# Patient Record
Sex: MALE | Race: WHITE | NOT HISPANIC OR LATINO | Employment: FULL TIME | URBAN - METROPOLITAN AREA
[De-identification: names, ages, dates, MRNs, and addresses within clinical notes are randomized per-mention and may not be internally consistent; named-entity substitution may affect disease eponyms.]

---

## 2024-08-14 ENCOUNTER — APPOINTMENT (OUTPATIENT)
Dept: RADIOLOGY | Facility: HOSPITAL | Age: 39
End: 2024-08-14

## 2024-08-14 ENCOUNTER — HOSPITAL ENCOUNTER (EMERGENCY)
Facility: HOSPITAL | Age: 39
Discharge: HOME | End: 2024-08-14
Attending: STUDENT IN AN ORGANIZED HEALTH CARE EDUCATION/TRAINING PROGRAM

## 2024-08-14 VITALS
RESPIRATION RATE: 16 BRPM | WEIGHT: 190 LBS | DIASTOLIC BLOOD PRESSURE: 77 MMHG | HEIGHT: 70 IN | OXYGEN SATURATION: 95 % | BODY MASS INDEX: 27.2 KG/M2 | SYSTOLIC BLOOD PRESSURE: 122 MMHG | TEMPERATURE: 98.1 F | HEART RATE: 98 BPM

## 2024-08-14 DIAGNOSIS — N45.1 EPIDIDYMITIS: Primary | ICD-10-CM

## 2024-08-14 LAB
ABO GROUP (TYPE) IN BLOOD: NORMAL
ALBUMIN SERPL BCP-MCNC: 3.8 G/DL (ref 3.4–5)
ALP SERPL-CCNC: 73 U/L (ref 33–120)
ALT SERPL W P-5'-P-CCNC: 24 U/L (ref 10–52)
ANION GAP SERPL CALC-SCNC: 11 MMOL/L (ref 10–20)
ANTIBODY SCREEN: NORMAL
AST SERPL W P-5'-P-CCNC: 24 U/L (ref 9–39)
BASOPHILS # BLD AUTO: 0.05 X10*3/UL (ref 0–0.1)
BASOPHILS NFR BLD AUTO: 0.4 %
BILIRUB SERPL-MCNC: 1.5 MG/DL (ref 0–1.2)
BUN SERPL-MCNC: 7 MG/DL (ref 6–23)
CALCIUM SERPL-MCNC: 9 MG/DL (ref 8.6–10.3)
CHLORIDE SERPL-SCNC: 96 MMOL/L (ref 98–107)
CO2 SERPL-SCNC: 26 MMOL/L (ref 21–32)
CREAT SERPL-MCNC: 0.97 MG/DL (ref 0.5–1.3)
EGFRCR SERPLBLD CKD-EPI 2021: >90 ML/MIN/1.73M*2
EOSINOPHIL # BLD AUTO: 0.06 X10*3/UL (ref 0–0.7)
EOSINOPHIL NFR BLD AUTO: 0.5 %
ERYTHROCYTE [DISTWIDTH] IN BLOOD BY AUTOMATED COUNT: 15.1 % (ref 11.5–14.5)
GLUCOSE SERPL-MCNC: 144 MG/DL (ref 74–99)
HCT VFR BLD AUTO: 35.1 % (ref 41–52)
HGB BLD-MCNC: 11.7 G/DL (ref 13.5–17.5)
IMM GRANULOCYTES # BLD AUTO: 0.06 X10*3/UL (ref 0–0.7)
IMM GRANULOCYTES NFR BLD AUTO: 0.5 % (ref 0–0.9)
INR PPP: 1.4 (ref 0.9–1.1)
LACTATE SERPL-SCNC: 1.2 MMOL/L (ref 0.4–2)
LYMPHOCYTES # BLD AUTO: 1.37 X10*3/UL (ref 1.2–4.8)
LYMPHOCYTES NFR BLD AUTO: 10.6 %
MCH RBC QN AUTO: 28.7 PG (ref 26–34)
MCHC RBC AUTO-ENTMCNC: 33.3 G/DL (ref 32–36)
MCV RBC AUTO: 86 FL (ref 80–100)
MONOCYTES # BLD AUTO: 1.07 X10*3/UL (ref 0.1–1)
MONOCYTES NFR BLD AUTO: 8.3 %
NEUTROPHILS # BLD AUTO: 10.34 X10*3/UL (ref 1.2–7.7)
NEUTROPHILS NFR BLD AUTO: 79.7 %
NRBC BLD-RTO: 0 /100 WBCS (ref 0–0)
PLATELET # BLD AUTO: 256 X10*3/UL (ref 150–450)
POTASSIUM SERPL-SCNC: 3.9 MMOL/L (ref 3.5–5.3)
PROT SERPL-MCNC: 8.3 G/DL (ref 6.4–8.2)
PROTHROMBIN TIME: 16 SECONDS (ref 9.8–12.8)
RBC # BLD AUTO: 4.07 X10*6/UL (ref 4.5–5.9)
RH FACTOR (ANTIGEN D): NORMAL
SODIUM SERPL-SCNC: 129 MMOL/L (ref 136–145)
WBC # BLD AUTO: 13 X10*3/UL (ref 4.4–11.3)

## 2024-08-14 PROCEDURE — 2550000001 HC RX 255 CONTRASTS: Performed by: STUDENT IN AN ORGANIZED HEALTH CARE EDUCATION/TRAINING PROGRAM

## 2024-08-14 PROCEDURE — 83605 ASSAY OF LACTIC ACID: CPT | Performed by: STUDENT IN AN ORGANIZED HEALTH CARE EDUCATION/TRAINING PROGRAM

## 2024-08-14 PROCEDURE — 76870 US EXAM SCROTUM: CPT | Mod: FOREIGN READ | Performed by: RADIOLOGY

## 2024-08-14 PROCEDURE — 96365 THER/PROPH/DIAG IV INF INIT: CPT | Mod: 59

## 2024-08-14 PROCEDURE — 99285 EMERGENCY DEPT VISIT HI MDM: CPT | Mod: 25

## 2024-08-14 PROCEDURE — 36415 COLL VENOUS BLD VENIPUNCTURE: CPT | Performed by: STUDENT IN AN ORGANIZED HEALTH CARE EDUCATION/TRAINING PROGRAM

## 2024-08-14 PROCEDURE — 85610 PROTHROMBIN TIME: CPT | Performed by: STUDENT IN AN ORGANIZED HEALTH CARE EDUCATION/TRAINING PROGRAM

## 2024-08-14 PROCEDURE — 86901 BLOOD TYPING SEROLOGIC RH(D): CPT | Performed by: STUDENT IN AN ORGANIZED HEALTH CARE EDUCATION/TRAINING PROGRAM

## 2024-08-14 PROCEDURE — 96361 HYDRATE IV INFUSION ADD-ON: CPT

## 2024-08-14 PROCEDURE — 2500000001 HC RX 250 WO HCPCS SELF ADMINISTERED DRUGS (ALT 637 FOR MEDICARE OP): Performed by: STUDENT IN AN ORGANIZED HEALTH CARE EDUCATION/TRAINING PROGRAM

## 2024-08-14 PROCEDURE — 74177 CT ABD & PELVIS W/CONTRAST: CPT

## 2024-08-14 PROCEDURE — 85025 COMPLETE CBC W/AUTO DIFF WBC: CPT | Performed by: STUDENT IN AN ORGANIZED HEALTH CARE EDUCATION/TRAINING PROGRAM

## 2024-08-14 PROCEDURE — 76870 US EXAM SCROTUM: CPT

## 2024-08-14 PROCEDURE — 96375 TX/PRO/DX INJ NEW DRUG ADDON: CPT

## 2024-08-14 PROCEDURE — 84075 ASSAY ALKALINE PHOSPHATASE: CPT | Performed by: STUDENT IN AN ORGANIZED HEALTH CARE EDUCATION/TRAINING PROGRAM

## 2024-08-14 PROCEDURE — 2500000004 HC RX 250 GENERAL PHARMACY W/ HCPCS (ALT 636 FOR OP/ED): Performed by: STUDENT IN AN ORGANIZED HEALTH CARE EDUCATION/TRAINING PROGRAM

## 2024-08-14 RX ORDER — KETOROLAC TROMETHAMINE 30 MG/ML
15 INJECTION, SOLUTION INTRAMUSCULAR; INTRAVENOUS ONCE
Status: COMPLETED | OUTPATIENT
Start: 2024-08-14 | End: 2024-08-14

## 2024-08-14 RX ORDER — CEFUROXIME AXETIL 500 MG/1
500 TABLET ORAL 2 TIMES DAILY
Qty: 20 TABLET | Refills: 0 | Status: ON HOLD | OUTPATIENT
Start: 2024-08-14 | End: 2024-08-24

## 2024-08-14 RX ORDER — HYDROMORPHONE HYDROCHLORIDE 1 MG/ML
1 INJECTION, SOLUTION INTRAMUSCULAR; INTRAVENOUS; SUBCUTANEOUS ONCE
Status: COMPLETED | OUTPATIENT
Start: 2024-08-14 | End: 2024-08-14

## 2024-08-14 RX ORDER — ACETAMINOPHEN 500 MG
1000 TABLET ORAL EVERY 8 HOURS PRN
Qty: 30 TABLET | Refills: 0 | Status: ON HOLD | OUTPATIENT
Start: 2024-08-14 | End: 2024-08-19

## 2024-08-14 RX ORDER — KETOROLAC TROMETHAMINE 10 MG/1
10 TABLET, FILM COATED ORAL EVERY 6 HOURS PRN
Qty: 20 TABLET | Refills: 0 | Status: ON HOLD | OUTPATIENT
Start: 2024-08-14 | End: 2024-08-19

## 2024-08-14 RX ORDER — DOXYCYCLINE 100 MG/1
100 CAPSULE ORAL ONCE
Status: COMPLETED | OUTPATIENT
Start: 2024-08-14 | End: 2024-08-14

## 2024-08-14 RX ORDER — OXYCODONE AND ACETAMINOPHEN 5; 325 MG/1; MG/1
1 TABLET ORAL ONCE
Status: COMPLETED | OUTPATIENT
Start: 2024-08-14 | End: 2024-08-14

## 2024-08-14 RX ORDER — CEFTRIAXONE 1 G/50ML
1 INJECTION, SOLUTION INTRAVENOUS ONCE
Status: COMPLETED | OUTPATIENT
Start: 2024-08-14 | End: 2024-08-14

## 2024-08-14 RX ORDER — DOXYCYCLINE 100 MG/1
100 CAPSULE ORAL 2 TIMES DAILY
Qty: 20 CAPSULE | Refills: 0 | Status: ON HOLD | OUTPATIENT
Start: 2024-08-14 | End: 2024-08-24

## 2024-08-14 ASSESSMENT — LIFESTYLE VARIABLES
EVER FELT BAD OR GUILTY ABOUT YOUR DRINKING: NO
EVER HAD A DRINK FIRST THING IN THE MORNING TO STEADY YOUR NERVES TO GET RID OF A HANGOVER: NO
HAVE YOU EVER FELT YOU SHOULD CUT DOWN ON YOUR DRINKING: NO
HAVE PEOPLE ANNOYED YOU BY CRITICIZING YOUR DRINKING: NO
TOTAL SCORE: 0

## 2024-08-14 ASSESSMENT — PAIN SCALES - GENERAL
PAINLEVEL_OUTOF10: 10 - WORST POSSIBLE PAIN
PAINLEVEL_OUTOF10: 9
PAINLEVEL_OUTOF10: 10 - WORST POSSIBLE PAIN
PAINLEVEL_OUTOF10: 8

## 2024-08-14 ASSESSMENT — COLUMBIA-SUICIDE SEVERITY RATING SCALE - C-SSRS
1. IN THE PAST MONTH, HAVE YOU WISHED YOU WERE DEAD OR WISHED YOU COULD GO TO SLEEP AND NOT WAKE UP?: NO
2. HAVE YOU ACTUALLY HAD ANY THOUGHTS OF KILLING YOURSELF?: NO
6. HAVE YOU EVER DONE ANYTHING, STARTED TO DO ANYTHING, OR PREPARED TO DO ANYTHING TO END YOUR LIFE?: NO

## 2024-08-14 ASSESSMENT — PAIN DESCRIPTION - LOCATION: LOCATION: GROIN

## 2024-08-14 ASSESSMENT — PAIN DESCRIPTION - ORIENTATION: ORIENTATION: LEFT

## 2024-08-14 ASSESSMENT — PAIN - FUNCTIONAL ASSESSMENT: PAIN_FUNCTIONAL_ASSESSMENT: 0-10

## 2024-08-14 NOTE — ED PROVIDER NOTES
"    HPI   Chief Complaint   Patient presents with    Groin Swelling     L testicle pain for 24 hours no know injury       HPI: This is a 39-year-old male, otherwise healthy, he is presenting to the emergency department for left testicular pain.  Started yesterday, progressively getting worse.  Noticed left groin pain and swelling.  Denies any trauma.  Endorses nausea associated with the pain, no vomiting, no diarrhea, no abdominal or flank pain.  He has never had pain like this before.  He is not currently sexually active.      ROS: Performed and is otherwise negative except as noted in the history of present illness    PMH: Reviewed, documented below in note. Pertinents in HPI  PSH: Reviewed and documented below in note. Pertinents in HPI  SH: No illicits. Not homeless  Fam: Reviewed, noncontributory to patients current complaint  MEDS: Reviewed and documented below in note. Pertinents in HPI  ALLERGIES: Reviewed and documented below in note      General Who is seen in room 6      History provided by:  Patient   used: No                          [place] Coma Scale Score: 15                  Patient History   History reviewed. No pertinent past medical history.  History reviewed. No pertinent surgical history.  No family history on file.  Social History     Tobacco Use    Smoking status: Not on file    Smokeless tobacco: Not on file   Substance Use Topics    Alcohol use: Not on file    Drug use: Not on file       Physical Exam   Visit Vitals  /77 (BP Location: Left arm)   Pulse 98   Temp 36.7 °C (98.1 °F)   Resp 16   Ht 1.778 m (5' 10\")   Wt 86.2 kg (190 lb)   SpO2 95%   BMI 27.26 kg/m²   BSA 2.06 m²      Physical Exam  Vitals and nursing note reviewed. Exam conducted with a chaperone present.   Constitutional:       General: He is in acute distress.      Appearance: Normal appearance.   HENT:      Head: Normocephalic and atraumatic.   Cardiovascular:      Rate and Rhythm: Normal rate and " regular rhythm.      Pulses: Normal pulses.      Heart sounds: Normal heart sounds.   Pulmonary:      Effort: Pulmonary effort is normal.      Breath sounds: Normal breath sounds.   Abdominal:      General: There is no distension.      Palpations: Abdomen is soft.      Tenderness: There is no abdominal tenderness. There is no guarding or rebound.   Genitourinary:     Penis: Normal.       Comments: Patient's scrotum is swollen, L sided, with erythema. TTP noted  Musculoskeletal:      Cervical back: Normal range of motion.   Skin:     General: Skin is warm and dry.      Capillary Refill: Capillary refill takes less than 2 seconds.      Findings: Erythema present.   Neurological:      Mental Status: He is alert.         CT abdomen pelvis w IV contrast   Final Result   In the included chest there is wall thickening in the distal esophagus   suggesting esophagitis..   The left scrotum shows a small hydrocele and increased vascularity.    There are small fat-containing inguinal hernias bilaterally, slightly   larger on the left.   There are no renal calculi and no evidence of hydronephrosis.   Signed by Ernesto Witt MD      US scrotum   Final Result   Enlarged, heterogeneous, hypervascular left epididymis suggestive of   epididymitis.   Complex left hydrocele.   Normal testicular spectral Doppler evaluation.   Signed by Nick Osei MD          Labs Reviewed   CBC WITH AUTO DIFFERENTIAL - Abnormal       Result Value    WBC 13.0 (*)     nRBC 0.0      RBC 4.07 (*)     Hemoglobin 11.7 (*)     Hematocrit 35.1 (*)     MCV 86      MCH 28.7      MCHC 33.3      RDW 15.1 (*)     Platelets 256      Neutrophils % 79.7      Immature Granulocytes %, Automated 0.5      Lymphocytes % 10.6      Monocytes % 8.3      Eosinophils % 0.5      Basophils % 0.4      Neutrophils Absolute 10.34 (*)     Immature Granulocytes Absolute, Automated 0.06      Lymphocytes Absolute 1.37      Monocytes Absolute 1.07 (*)     Eosinophils Absolute 0.06       Basophils Absolute 0.05     COMPREHENSIVE METABOLIC PANEL - Abnormal    Glucose 144 (*)     Sodium 129 (*)     Potassium 3.9      Chloride 96 (*)     Bicarbonate 26      Anion Gap 11      Urea Nitrogen 7      Creatinine 0.97      eGFR >90      Calcium 9.0      Albumin 3.8      Alkaline Phosphatase 73      Total Protein 8.3 (*)     AST 24      Bilirubin, Total 1.5 (*)     ALT 24     PROTIME-INR - Abnormal    Protime 16.0 (*)     INR 1.4 (*)    LACTATE - Normal    Lactate 1.2      Narrative:     Venipuncture immediately after or during the administration of Metamizole may lead to falsely low results. Testing should be performed immediately  prior to Metamizole dosing.   TYPE AND SCREEN    ABO TYPE B      Rh TYPE POS      ANTIBODY SCREEN NEG     URINALYSIS WITH REFLEX CULTURE AND MICROSCOPIC    Narrative:     The following orders were created for panel order Urinalysis with Reflex Culture and Microscopic.  Procedure                               Abnormality         Status                     ---------                               -----------         ------                     Urinalysis with Reflex Cu...[[id number]]                                                 Extra Urine Gray Tube[[id number]]                                                          Please view results for these tests on the individual orders.   URINALYSIS WITH REFLEX CULTURE AND MICROSCOPIC   EXTRA URINE GRAY TUBE         ED Course & MDM   Diagnoses as of 08/14/24 2203   Epididymitis           Medical Decision Making  All mentioned lab results, ECGs, and imaging were independently reviewed by myself  - Patient evaluated. Testicular torsion, abscess,Patient is presenting to the emergency department for left testicular pain and scrotal swelling.  Differential includes but is not limited to potential hydrocele, epididymitis, or inguinal hernia.  Analgesia was provided and basic labs, and imaging was obtained.  Labs show a slight leukocytosis.  He has  a mild hyponatremia.  Lactate is within normal limits.  Ultrasound of the scrotum demonstrates an enlarged heterogeneous hypervascular left epididymis suggestive of epididymitis with a complex left hydrocele with normal testicular spectral Doppler evaluation.  CT scan of the abdomen and pelvis with IV contrast again shows this hydrocele with increased vascularity with only a small fat-containing inguinal hernia.  Antibiotics were initiated.  Pain was controlled with analgesia here in the emergency department.  This time I feel the patient is stable for discharge with antibiotics and pain medicine to go home with as well as supportive care therapies.  Urology referral was placed.  Strict return precautions were given and discussed.    - Monitored for any changes in stability or symptomatology. Patient remained stable.   - Counseled regarding labs, imaging, diagnosis, and plan. Patient was agreeable. All questions were answered. The patient was receptive and agreeable to the plan of care.   -The patient was instructed to return to the emergency department if any symptoms recurred, worsened, or if there were any additional concerns.    *Disclaimer: This note was dictated by speech recognition. Minor errors in transcription may be present. Please call with questions.    Сергей Love MD             Your medication list        START taking these medications        Instructions Last Dose Given Next Dose Due   acetaminophen 500 mg tablet  Commonly known as: Tylenol Extra Strength      Take 2 tablets (1,000 mg) by mouth every 8 hours if needed for moderate pain (4 - 6) for up to 5 days.       cefuroxime 500 mg tablet  Commonly known as: Ceftin      Take 1 tablet (500 mg) by mouth 2 times a day for 10 days.       doxycycline 100 mg capsule  Commonly known as: Vibramycin      Take 1 capsule (100 mg) by mouth 2 times a day for 10 days. Take with at least 8 ounces (large glass) of water, do not lie down for 30 minutes  after       ketorolac 10 mg tablet  Commonly known as: Toradol      Take 1 tablet (10 mg) by mouth every 6 hours if needed for moderate pain (4 - 6) for up to 5 days.                 Where to Get Your Medications        These medications were sent to [facility] - [place], [place] - [address] [address]  [address] [address]. [address], [facility] [address]      Phone: [phone]   acetaminophen 500 mg tablet  cefuroxime 500 mg tablet  doxycycline 100 mg capsule  ketorolac 10 mg tablet         Procedure  Procedures     *This report was transcribed using voice recognition software.  Every effort was made to ensure accuracy; however, inadvertent computerized transcription errors may be present.*  Ricardo Love MD  08/14/24         Ricardo Love MD  08/14/24 1708

## 2024-08-18 ENCOUNTER — APPOINTMENT (OUTPATIENT)
Dept: RADIOLOGY | Facility: HOSPITAL | Age: 39
End: 2024-08-18

## 2024-08-18 ENCOUNTER — HOSPITAL ENCOUNTER (INPATIENT)
Facility: HOSPITAL | Age: 39
LOS: 2 days | Discharge: HOME | End: 2024-08-20
Attending: STUDENT IN AN ORGANIZED HEALTH CARE EDUCATION/TRAINING PROGRAM | Admitting: INTERNAL MEDICINE

## 2024-08-18 DIAGNOSIS — N45.1 EPIDIDYMITIS: Primary | ICD-10-CM

## 2024-08-18 DIAGNOSIS — N45.3 EPIDIDYMOORCHITIS: ICD-10-CM

## 2024-08-18 DIAGNOSIS — D64.9 NORMOCYTIC NORMOCHROMIC ANEMIA: ICD-10-CM

## 2024-08-18 DIAGNOSIS — R79.89 ELEVATED BRAIN NATRIURETIC PEPTIDE (BNP) LEVEL: ICD-10-CM

## 2024-08-18 LAB
ALBUMIN SERPL BCP-MCNC: 3.4 G/DL (ref 3.4–5)
ALP SERPL-CCNC: 79 U/L (ref 33–120)
ALT SERPL W P-5'-P-CCNC: 28 U/L (ref 10–52)
AMORPH CRY #/AREA UR COMP ASSIST: ABNORMAL /HPF
ANION GAP SERPL CALC-SCNC: 11 MMOL/L (ref 10–20)
APPEARANCE UR: ABNORMAL
AST SERPL W P-5'-P-CCNC: 31 U/L (ref 9–39)
BASOPHILS # BLD AUTO: 0.05 X10*3/UL (ref 0–0.1)
BASOPHILS NFR BLD AUTO: 0.7 %
BILIRUB SERPL-MCNC: 0.7 MG/DL (ref 0–1.2)
BILIRUB UR STRIP.AUTO-MCNC: NEGATIVE MG/DL
BNP SERPL-MCNC: 451 PG/ML (ref 0–99)
BUN SERPL-MCNC: 17 MG/DL (ref 6–23)
CALCIUM SERPL-MCNC: 8.7 MG/DL (ref 8.6–10.3)
CARDIAC TROPONIN I PNL SERPL HS: 5 NG/L (ref 0–20)
CHLORIDE SERPL-SCNC: 101 MMOL/L (ref 98–107)
CO2 SERPL-SCNC: 25 MMOL/L (ref 21–32)
COLOR UR: YELLOW
CREAT SERPL-MCNC: 1.01 MG/DL (ref 0.5–1.3)
EGFRCR SERPLBLD CKD-EPI 2021: >90 ML/MIN/1.73M*2
EOSINOPHIL # BLD AUTO: 0.12 X10*3/UL (ref 0–0.7)
EOSINOPHIL NFR BLD AUTO: 1.7 %
ERYTHROCYTE [DISTWIDTH] IN BLOOD BY AUTOMATED COUNT: 15 % (ref 11.5–14.5)
GLUCOSE SERPL-MCNC: 110 MG/DL (ref 74–99)
GLUCOSE UR STRIP.AUTO-MCNC: NORMAL MG/DL
HCT VFR BLD AUTO: 31.7 % (ref 41–52)
HGB BLD-MCNC: 10.2 G/DL (ref 13.5–17.5)
IMM GRANULOCYTES # BLD AUTO: 0.05 X10*3/UL (ref 0–0.7)
IMM GRANULOCYTES NFR BLD AUTO: 0.7 % (ref 0–0.9)
KETONES UR STRIP.AUTO-MCNC: NEGATIVE MG/DL
LACTATE SERPL-SCNC: 1.1 MMOL/L (ref 0.4–2)
LEUKOCYTE ESTERASE UR QL STRIP.AUTO: ABNORMAL
LYMPHOCYTES # BLD AUTO: 0.96 X10*3/UL (ref 1.2–4.8)
LYMPHOCYTES NFR BLD AUTO: 13.5 %
MCH RBC QN AUTO: 28.6 PG (ref 26–34)
MCHC RBC AUTO-ENTMCNC: 32.2 G/DL (ref 32–36)
MCV RBC AUTO: 89 FL (ref 80–100)
MONOCYTES # BLD AUTO: 0.61 X10*3/UL (ref 0.1–1)
MONOCYTES NFR BLD AUTO: 8.6 %
MUCOUS THREADS #/AREA URNS AUTO: ABNORMAL /LPF
NEUTROPHILS # BLD AUTO: 5.32 X10*3/UL (ref 1.2–7.7)
NEUTROPHILS NFR BLD AUTO: 74.8 %
NITRITE UR QL STRIP.AUTO: NEGATIVE
NRBC BLD-RTO: 0 /100 WBCS (ref 0–0)
PH UR STRIP.AUTO: 6 [PH]
PLATELET # BLD AUTO: 285 X10*3/UL (ref 150–450)
POTASSIUM SERPL-SCNC: 4 MMOL/L (ref 3.5–5.3)
PROT SERPL-MCNC: 7.9 G/DL (ref 6.4–8.2)
PROT UR STRIP.AUTO-MCNC: ABNORMAL MG/DL
RBC # BLD AUTO: 3.57 X10*6/UL (ref 4.5–5.9)
RBC # UR STRIP.AUTO: ABNORMAL /UL
RBC #/AREA URNS AUTO: ABNORMAL /HPF
SODIUM SERPL-SCNC: 133 MMOL/L (ref 136–145)
SP GR UR STRIP.AUTO: 1.03
SQUAMOUS #/AREA URNS AUTO: ABNORMAL /HPF
UROBILINOGEN UR STRIP.AUTO-MCNC: NORMAL MG/DL
WBC # BLD AUTO: 7.1 X10*3/UL (ref 4.4–11.3)
WBC #/AREA URNS AUTO: >50 /HPF

## 2024-08-18 PROCEDURE — 96365 THER/PROPH/DIAG IV INF INIT: CPT

## 2024-08-18 PROCEDURE — 83605 ASSAY OF LACTIC ACID: CPT | Performed by: NURSE PRACTITIONER

## 2024-08-18 PROCEDURE — 85025 COMPLETE CBC W/AUTO DIFF WBC: CPT | Performed by: NURSE PRACTITIONER

## 2024-08-18 PROCEDURE — 84484 ASSAY OF TROPONIN QUANT: CPT | Performed by: STUDENT IN AN ORGANIZED HEALTH CARE EDUCATION/TRAINING PROGRAM

## 2024-08-18 PROCEDURE — 96361 HYDRATE IV INFUSION ADD-ON: CPT

## 2024-08-18 PROCEDURE — 99285 EMERGENCY DEPT VISIT HI MDM: CPT | Mod: 25

## 2024-08-18 PROCEDURE — 87086 URINE CULTURE/COLONY COUNT: CPT | Mod: PORLAB | Performed by: NURSE PRACTITIONER

## 2024-08-18 PROCEDURE — 71046 X-RAY EXAM CHEST 2 VIEWS: CPT | Mod: FOREIGN READ | Performed by: RADIOLOGY

## 2024-08-18 PROCEDURE — 99223 1ST HOSP IP/OBS HIGH 75: CPT | Performed by: STUDENT IN AN ORGANIZED HEALTH CARE EDUCATION/TRAINING PROGRAM

## 2024-08-18 PROCEDURE — 76870 US EXAM SCROTUM: CPT

## 2024-08-18 PROCEDURE — 96372 THER/PROPH/DIAG INJ SC/IM: CPT | Performed by: STUDENT IN AN ORGANIZED HEALTH CARE EDUCATION/TRAINING PROGRAM

## 2024-08-18 PROCEDURE — 71046 X-RAY EXAM CHEST 2 VIEWS: CPT

## 2024-08-18 PROCEDURE — 76870 US EXAM SCROTUM: CPT | Mod: FOREIGN READ | Performed by: RADIOLOGY

## 2024-08-18 PROCEDURE — 81001 URINALYSIS AUTO W/SCOPE: CPT | Performed by: NURSE PRACTITIONER

## 2024-08-18 PROCEDURE — 83880 ASSAY OF NATRIURETIC PEPTIDE: CPT | Performed by: STUDENT IN AN ORGANIZED HEALTH CARE EDUCATION/TRAINING PROGRAM

## 2024-08-18 PROCEDURE — 1200000002 HC GENERAL ROOM WITH TELEMETRY DAILY

## 2024-08-18 PROCEDURE — 96375 TX/PRO/DX INJ NEW DRUG ADDON: CPT

## 2024-08-18 PROCEDURE — 2500000001 HC RX 250 WO HCPCS SELF ADMINISTERED DRUGS (ALT 637 FOR MEDICARE OP): Performed by: STUDENT IN AN ORGANIZED HEALTH CARE EDUCATION/TRAINING PROGRAM

## 2024-08-18 PROCEDURE — 36415 COLL VENOUS BLD VENIPUNCTURE: CPT | Performed by: NURSE PRACTITIONER

## 2024-08-18 PROCEDURE — 80053 COMPREHEN METABOLIC PANEL: CPT | Performed by: NURSE PRACTITIONER

## 2024-08-18 PROCEDURE — 2500000004 HC RX 250 GENERAL PHARMACY W/ HCPCS (ALT 636 FOR OP/ED): Performed by: NURSE PRACTITIONER

## 2024-08-18 PROCEDURE — 2500000004 HC RX 250 GENERAL PHARMACY W/ HCPCS (ALT 636 FOR OP/ED): Performed by: STUDENT IN AN ORGANIZED HEALTH CARE EDUCATION/TRAINING PROGRAM

## 2024-08-18 RX ORDER — POLYETHYLENE GLYCOL 3350 17 G/17G
17 POWDER, FOR SOLUTION ORAL DAILY
Status: DISCONTINUED | OUTPATIENT
Start: 2024-08-19 | End: 2024-08-20 | Stop reason: HOSPADM

## 2024-08-18 RX ORDER — ENOXAPARIN SODIUM 100 MG/ML
40 INJECTION SUBCUTANEOUS EVERY 24 HOURS
Status: DISCONTINUED | OUTPATIENT
Start: 2024-08-18 | End: 2024-08-20 | Stop reason: HOSPADM

## 2024-08-18 RX ORDER — KETOROLAC TROMETHAMINE 30 MG/ML
15 INJECTION, SOLUTION INTRAMUSCULAR; INTRAVENOUS ONCE
Status: COMPLETED | OUTPATIENT
Start: 2024-08-18 | End: 2024-08-18

## 2024-08-18 RX ORDER — PANTOPRAZOLE SODIUM 40 MG/1
40 TABLET, DELAYED RELEASE ORAL
Status: DISCONTINUED | OUTPATIENT
Start: 2024-08-19 | End: 2024-08-20 | Stop reason: HOSPADM

## 2024-08-18 RX ORDER — ONDANSETRON HYDROCHLORIDE 2 MG/ML
4 INJECTION, SOLUTION INTRAVENOUS ONCE
Status: COMPLETED | OUTPATIENT
Start: 2024-08-18 | End: 2024-08-18

## 2024-08-18 RX ORDER — ONDANSETRON 4 MG/1
4 TABLET, FILM COATED ORAL EVERY 8 HOURS PRN
Status: DISCONTINUED | OUTPATIENT
Start: 2024-08-18 | End: 2024-08-20 | Stop reason: HOSPADM

## 2024-08-18 RX ORDER — ONDANSETRON HYDROCHLORIDE 2 MG/ML
4 INJECTION, SOLUTION INTRAVENOUS EVERY 8 HOURS PRN
Status: DISCONTINUED | OUTPATIENT
Start: 2024-08-18 | End: 2024-08-20 | Stop reason: HOSPADM

## 2024-08-18 RX ORDER — BISACODYL 5 MG
10 TABLET, DELAYED RELEASE (ENTERIC COATED) ORAL DAILY PRN
Status: DISCONTINUED | OUTPATIENT
Start: 2024-08-18 | End: 2024-08-20 | Stop reason: HOSPADM

## 2024-08-18 RX ORDER — CEFTRIAXONE 1 G/50ML
1 INJECTION, SOLUTION INTRAVENOUS EVERY 24 HOURS
Status: DISCONTINUED | OUTPATIENT
Start: 2024-08-19 | End: 2024-08-20 | Stop reason: HOSPADM

## 2024-08-18 RX ORDER — GUAIFENESIN 600 MG/1
600 TABLET, EXTENDED RELEASE ORAL EVERY 12 HOURS PRN
Status: DISCONTINUED | OUTPATIENT
Start: 2024-08-18 | End: 2024-08-20 | Stop reason: HOSPADM

## 2024-08-18 RX ORDER — KETOROLAC TROMETHAMINE 30 MG/ML
30 INJECTION, SOLUTION INTRAMUSCULAR; INTRAVENOUS EVERY 8 HOURS SCHEDULED
Status: DISCONTINUED | OUTPATIENT
Start: 2024-08-18 | End: 2024-08-20 | Stop reason: HOSPADM

## 2024-08-18 RX ORDER — ACETAMINOPHEN 325 MG/1
650 TABLET ORAL EVERY 4 HOURS PRN
Status: DISCONTINUED | OUTPATIENT
Start: 2024-08-18 | End: 2024-08-20 | Stop reason: HOSPADM

## 2024-08-18 RX ORDER — BISACODYL 10 MG/1
10 SUPPOSITORY RECTAL DAILY PRN
Status: DISCONTINUED | OUTPATIENT
Start: 2024-08-18 | End: 2024-08-20 | Stop reason: HOSPADM

## 2024-08-18 RX ORDER — MORPHINE SULFATE 4 MG/ML
4 INJECTION INTRAVENOUS ONCE
Status: COMPLETED | OUTPATIENT
Start: 2024-08-18 | End: 2024-08-18

## 2024-08-18 RX ORDER — CEFTRIAXONE 1 G/50ML
1 INJECTION, SOLUTION INTRAVENOUS ONCE
Status: COMPLETED | OUTPATIENT
Start: 2024-08-18 | End: 2024-08-18

## 2024-08-18 RX ORDER — PANTOPRAZOLE SODIUM 40 MG/10ML
40 INJECTION, POWDER, LYOPHILIZED, FOR SOLUTION INTRAVENOUS
Status: DISCONTINUED | OUTPATIENT
Start: 2024-08-19 | End: 2024-08-20 | Stop reason: HOSPADM

## 2024-08-18 RX ORDER — TALC
3 POWDER (GRAM) TOPICAL NIGHTLY PRN
Status: DISCONTINUED | OUTPATIENT
Start: 2024-08-18 | End: 2024-08-20 | Stop reason: HOSPADM

## 2024-08-18 RX ORDER — OXYCODONE HYDROCHLORIDE 5 MG/1
2.5 TABLET ORAL EVERY 4 HOURS PRN
Status: DISCONTINUED | OUTPATIENT
Start: 2024-08-18 | End: 2024-08-20 | Stop reason: HOSPADM

## 2024-08-18 RX ORDER — OXYCODONE AND ACETAMINOPHEN 5; 325 MG/1; MG/1
1 TABLET ORAL EVERY 6 HOURS PRN
Status: DISCONTINUED | OUTPATIENT
Start: 2024-08-18 | End: 2024-08-20 | Stop reason: HOSPADM

## 2024-08-18 SDOH — SOCIAL STABILITY: SOCIAL INSECURITY: ARE YOU OR HAVE YOU BEEN THREATENED OR ABUSED PHYSICALLY, EMOTIONALLY, OR SEXUALLY BY ANYONE?: NO

## 2024-08-18 SDOH — SOCIAL STABILITY: SOCIAL INSECURITY: DO YOU FEEL ANYONE HAS EXPLOITED OR TAKEN ADVANTAGE OF YOU FINANCIALLY OR OF YOUR PERSONAL PROPERTY?: NO

## 2024-08-18 SDOH — SOCIAL STABILITY: SOCIAL INSECURITY: WERE YOU ABLE TO COMPLETE ALL THE BEHAVIORAL HEALTH SCREENINGS?: YES

## 2024-08-18 SDOH — SOCIAL STABILITY: SOCIAL INSECURITY: HAVE YOU HAD ANY THOUGHTS OF HARMING ANYONE ELSE?: NO

## 2024-08-18 SDOH — SOCIAL STABILITY: SOCIAL INSECURITY: HAVE YOU HAD THOUGHTS OF HARMING ANYONE ELSE?: NO

## 2024-08-18 SDOH — SOCIAL STABILITY: SOCIAL INSECURITY: DOES ANYONE TRY TO KEEP YOU FROM HAVING/CONTACTING OTHER FRIENDS OR DOING THINGS OUTSIDE YOUR HOME?: NO

## 2024-08-18 SDOH — SOCIAL STABILITY: SOCIAL INSECURITY: ABUSE: ADULT

## 2024-08-18 SDOH — SOCIAL STABILITY: SOCIAL INSECURITY: HAS ANYONE EVER THREATENED TO HURT YOUR FAMILY OR YOUR PETS?: NO

## 2024-08-18 SDOH — SOCIAL STABILITY: SOCIAL INSECURITY: DO YOU FEEL UNSAFE GOING BACK TO THE PLACE WHERE YOU ARE LIVING?: NO

## 2024-08-18 SDOH — SOCIAL STABILITY: SOCIAL INSECURITY: ARE THERE ANY APPARENT SIGNS OF INJURIES/BEHAVIORS THAT COULD BE RELATED TO ABUSE/NEGLECT?: NO

## 2024-08-18 ASSESSMENT — PATIENT HEALTH QUESTIONNAIRE - PHQ9
2. FEELING DOWN, DEPRESSED OR HOPELESS: NOT AT ALL
SUM OF ALL RESPONSES TO PHQ9 QUESTIONS 1 & 2: 0
1. LITTLE INTEREST OR PLEASURE IN DOING THINGS: NOT AT ALL

## 2024-08-18 ASSESSMENT — ACTIVITIES OF DAILY LIVING (ADL)
FEEDING YOURSELF: INDEPENDENT
HEARING - RIGHT EAR: FUNCTIONAL
PATIENT'S MEMORY ADEQUATE TO SAFELY COMPLETE DAILY ACTIVITIES?: YES
DRESSING YOURSELF: INDEPENDENT
BATHING: INDEPENDENT
LACK_OF_TRANSPORTATION: NO
ASSISTIVE_DEVICE: EYEGLASSES;CONTACTS
HEARING - LEFT EAR: FUNCTIONAL
ADEQUATE_TO_COMPLETE_ADL: YES
WALKS IN HOME: INDEPENDENT
JUDGMENT_ADEQUATE_SAFELY_COMPLETE_DAILY_ACTIVITIES: YES
TOILETING: INDEPENDENT
GROOMING: INDEPENDENT

## 2024-08-18 ASSESSMENT — COGNITIVE AND FUNCTIONAL STATUS - GENERAL
DAILY ACTIVITIY SCORE: 24
MOBILITY SCORE: 24
PATIENT BASELINE BEDBOUND: NO

## 2024-08-18 ASSESSMENT — PAIN DESCRIPTION - ORIENTATION
ORIENTATION: LEFT

## 2024-08-18 ASSESSMENT — LIFESTYLE VARIABLES
AUDIT-C TOTAL SCORE: 3
HOW OFTEN DO YOU HAVE 6 OR MORE DRINKS ON ONE OCCASION: LESS THAN MONTHLY
HOW OFTEN DO YOU HAVE A DRINK CONTAINING ALCOHOL: MONTHLY OR LESS
HOW MANY STANDARD DRINKS CONTAINING ALCOHOL DO YOU HAVE ON A TYPICAL DAY: 3 OR 4
SKIP TO QUESTIONS 9-10: 0
AUDIT-C TOTAL SCORE: 3

## 2024-08-18 ASSESSMENT — COLUMBIA-SUICIDE SEVERITY RATING SCALE - C-SSRS
6. HAVE YOU EVER DONE ANYTHING, STARTED TO DO ANYTHING, OR PREPARED TO DO ANYTHING TO END YOUR LIFE?: NO
2. HAVE YOU ACTUALLY HAD ANY THOUGHTS OF KILLING YOURSELF?: NO
1. IN THE PAST MONTH, HAVE YOU WISHED YOU WERE DEAD OR WISHED YOU COULD GO TO SLEEP AND NOT WAKE UP?: NO

## 2024-08-18 ASSESSMENT — PAIN SCALES - GENERAL
PAINLEVEL_OUTOF10: 7
PAINLEVEL_OUTOF10: 8
PAINLEVEL_OUTOF10: 7
PAINLEVEL_OUTOF10: 8

## 2024-08-18 ASSESSMENT — PAIN - FUNCTIONAL ASSESSMENT: PAIN_FUNCTIONAL_ASSESSMENT: 0-10

## 2024-08-18 ASSESSMENT — PAIN DESCRIPTION - LOCATION
LOCATION: GROIN
LOCATION: SCROTUM
LOCATION: GROIN

## 2024-08-18 ASSESSMENT — PAIN DESCRIPTION - PAIN TYPE: TYPE: ACUTE PAIN

## 2024-08-18 ASSESSMENT — VISUAL ACUITY: OU: 1

## 2024-08-18 NOTE — ED PROVIDER NOTES
"    HPI   Chief Complaint   Patient presents with    Groin Swelling     Left testicle increasingly red/swelling/ hot. Pain not increased but pt taking pain meds       Patient presents the emergency department for recheck of epididymitis.  He was seen here in the emergency department recently having a CT and ultrasound completed obtaining his diagnosis.  He has had 6 doses of his antibiotic and routine and NSAID and pain medication.  He continues to have persistent pain with worsening redness and swelling.  He no longer has any urinary symptoms including no dysuria, hematuria, urinary frequency or urgency.  He has no fever, chills, myalgias or malaise.  There is no associated nausea, vomiting, abdominal pain or diarrhea.  There is no associated traumatic incident.  He denies having any intercourse or masturbation since diagnosis of his infection.  He has no history of  surgery.      History provided by:  Patient   used: No                          [place] Coma Scale Score: 15                  Patient History   History reviewed. No pertinent past medical history.  History reviewed. No pertinent surgical history.  No family history on file.  Social History     Tobacco Use    Smoking status: Never    Smokeless tobacco: Never   Substance Use Topics    Alcohol use: Yes     Comment: occasionally    Drug use: Never       Physical Exam   Visit Vitals  /69 (BP Location: Left arm, Patient Position: Sitting)   Pulse 81   Temp 36.9 °C (98.5 °F) (Tympanic)   Resp 20   Ht 1.778 m (5' 10\")   Wt 83.9 kg (185 lb)   SpO2 100%   BMI 26.54 kg/m²   Smoking Status Never   BSA 2.04 m²      Physical Exam  Constitutional:       Appearance: Normal appearance. He is not toxic-appearing.   HENT:      Head: Normocephalic and atraumatic.      Right Ear: Hearing normal.      Left Ear: Hearing normal.      Nose: Nose normal.      Mouth/Throat:      Lips: Pink.      Mouth: Mucous membranes are moist.   Eyes:      General: " Vision grossly intact.   Cardiovascular:      Rate and Rhythm: Normal rate and regular rhythm.      Pulses:           Radial pulses are 2+ on the right side.      Comments: No resting tachycardia   Pulmonary:      Effort: Pulmonary effort is normal.      Breath sounds: Normal breath sounds.   Abdominal:      General: Bowel sounds are normal.      Palpations: Abdomen is soft.      Tenderness: There is no abdominal tenderness. There is no right CVA tenderness or left CVA tenderness.   Genitourinary:     Comments:  exam to be completed by male provider at patient request  Musculoskeletal:      Cervical back: Normal range of motion and neck supple.      Comments: Moves all extremities randomly. Neurovascularly intact.    Skin:     General: Skin is warm and dry.      Capillary Refill: Capillary refill takes less than 2 seconds.   Neurological:      Mental Status: He is alert.   Psychiatric:         Behavior: Behavior is cooperative.         No orders to display       Labs Reviewed   CBC WITH AUTO DIFFERENTIAL   COMPREHENSIVE METABOLIC PANEL   LACTATE   URINALYSIS WITH REFLEX CULTURE AND MICROSCOPIC    Narrative:     The following orders were created for panel order Urinalysis with Reflex Culture and Microscopic.  Procedure                               Abnormality         Status                     ---------                               -----------         ------                     Urinalysis with Reflex C...[[id number]]                                                 Extra Urine Gray Tube[[id number]]                                                         Please view results for these tests on the individual orders.   URINALYSIS WITH REFLEX CULTURE AND MICROSCOPIC   EXTRA URINE GRAY TUBE       ED Course & MDM     Medical Decision Making  I saw the patient as the Clinician in Triage and performed a brief history and physical exam, established acuity, and ordered appropriate tests to develop basic plan of care. Patient  will be seen by an [facility], resident and/or physician who will independently evaluate the patient. Please see subsequent provider notes for further details and disposition.     Brief HPI: In brief, Arsh Manzo presents to the ED for evaluation of worsening scrotal redness and swelling.    MDM: Differential diagnosis of epididymitis, scrotal abscess and testicular torsion to mention a few.    PLAN: Initial treatment is for diagnostic testing while awaiting bed availability then proceed to treatment area for ED attending/MLP to continue care.          Diagnoses as of 08/18/24 1511   Epididymitis          Your medication list        ASK your doctor about these medications        Instructions Last Dose Given Next Dose Due   acetaminophen 500 mg tablet  Commonly known as: Tylenol Extra Strength      Take 2 tablets (1,000 mg) by mouth every 8 hours if needed for moderate pain (4 - 6) for up to 5 days.       cefuroxime 500 mg tablet  Commonly known as: Ceftin      Take 1 tablet (500 mg) by mouth 2 times a day for 10 days.       doxycycline 100 mg capsule  Commonly known as: Vibramycin      Take 1 capsule (100 mg) by mouth 2 times a day for 10 days. Take with at least 8 ounces (large glass) of water, do not lie down for 30 minutes after       ketorolac 10 mg tablet  Commonly known as: Toradol      Take 1 tablet (10 mg) by mouth every 6 hours if needed for moderate pain (4 - 6) for up to 5 days.                Procedure  none     *This report was transcribed using voice recognition software.  Every effort was made to ensure accuracy; however, inadvertent computerized transcription errors may be present.*  ANNABELLE Villalobos-SUMAYA  08/18/24         ANNABELLE Villalobos-SUMAYA  08/18/24 1511

## 2024-08-19 LAB
ALBUMIN SERPL BCP-MCNC: 3.1 G/DL (ref 3.4–5)
ALP SERPL-CCNC: 71 U/L (ref 33–120)
ALT SERPL W P-5'-P-CCNC: 27 U/L (ref 10–52)
ANION GAP SERPL CALC-SCNC: 11 MMOL/L (ref 10–20)
AST SERPL W P-5'-P-CCNC: 35 U/L (ref 9–39)
BASOPHILS # BLD AUTO: 0.05 X10*3/UL (ref 0–0.1)
BASOPHILS NFR BLD AUTO: 0.8 %
BILIRUB SERPL-MCNC: 0.6 MG/DL (ref 0–1.2)
BUN SERPL-MCNC: 16 MG/DL (ref 6–23)
CALCIUM SERPL-MCNC: 8.7 MG/DL (ref 8.6–10.3)
CHLORIDE SERPL-SCNC: 105 MMOL/L (ref 98–107)
CO2 SERPL-SCNC: 23 MMOL/L (ref 21–32)
CREAT SERPL-MCNC: 0.88 MG/DL (ref 0.5–1.3)
EGFRCR SERPLBLD CKD-EPI 2021: >90 ML/MIN/1.73M*2
EOSINOPHIL # BLD AUTO: 0.16 X10*3/UL (ref 0–0.7)
EOSINOPHIL NFR BLD AUTO: 2.5 %
ERYTHROCYTE [DISTWIDTH] IN BLOOD BY AUTOMATED COUNT: 15 % (ref 11.5–14.5)
FERRITIN SERPL-MCNC: 59 NG/ML (ref 20–300)
FOLATE SERPL-MCNC: 4.1 NG/ML
GLUCOSE SERPL-MCNC: 79 MG/DL (ref 74–99)
HCT VFR BLD AUTO: 29.3 % (ref 41–52)
HGB BLD-MCNC: 9.3 G/DL (ref 13.5–17.5)
HOLD SPECIMEN: NORMAL
IMM GRANULOCYTES # BLD AUTO: 0.03 X10*3/UL (ref 0–0.7)
IMM GRANULOCYTES NFR BLD AUTO: 0.5 % (ref 0–0.9)
IRON SATN MFR SERPL: 9 % (ref 25–45)
IRON SERPL-MCNC: 30 UG/DL (ref 35–150)
LYMPHOCYTES # BLD AUTO: 1.44 X10*3/UL (ref 1.2–4.8)
LYMPHOCYTES NFR BLD AUTO: 22.6 %
MAGNESIUM SERPL-MCNC: 2.09 MG/DL (ref 1.6–2.4)
MCH RBC QN AUTO: 28.5 PG (ref 26–34)
MCHC RBC AUTO-ENTMCNC: 31.7 G/DL (ref 32–36)
MCV RBC AUTO: 90 FL (ref 80–100)
MONOCYTES # BLD AUTO: 0.57 X10*3/UL (ref 0.1–1)
MONOCYTES NFR BLD AUTO: 8.9 %
NEUTROPHILS # BLD AUTO: 4.12 X10*3/UL (ref 1.2–7.7)
NEUTROPHILS NFR BLD AUTO: 64.7 %
NRBC BLD-RTO: 0 /100 WBCS (ref 0–0)
PLATELET # BLD AUTO: 259 X10*3/UL (ref 150–450)
POTASSIUM SERPL-SCNC: 3.8 MMOL/L (ref 3.5–5.3)
PROT SERPL-MCNC: 7.2 G/DL (ref 6.4–8.2)
RBC # BLD AUTO: 3.26 X10*6/UL (ref 4.5–5.9)
SODIUM SERPL-SCNC: 135 MMOL/L (ref 136–145)
T4 FREE SERPL-MCNC: 1.11 NG/DL (ref 0.61–1.12)
TIBC SERPL-MCNC: 334 UG/DL (ref 240–445)
TSH SERPL-ACNC: 5.68 MIU/L (ref 0.44–3.98)
UIBC SERPL-MCNC: 304 UG/DL (ref 110–370)
VIT B12 SERPL-MCNC: 200 PG/ML (ref 211–911)
WBC # BLD AUTO: 6.4 X10*3/UL (ref 4.4–11.3)

## 2024-08-19 PROCEDURE — 2500000004 HC RX 250 GENERAL PHARMACY W/ HCPCS (ALT 636 FOR OP/ED): Performed by: STUDENT IN AN ORGANIZED HEALTH CARE EDUCATION/TRAINING PROGRAM

## 2024-08-19 PROCEDURE — 1100000001 HC PRIVATE ROOM DAILY

## 2024-08-19 PROCEDURE — 80053 COMPREHEN METABOLIC PANEL: CPT | Performed by: STUDENT IN AN ORGANIZED HEALTH CARE EDUCATION/TRAINING PROGRAM

## 2024-08-19 PROCEDURE — 83550 IRON BINDING TEST: CPT | Performed by: STUDENT IN AN ORGANIZED HEALTH CARE EDUCATION/TRAINING PROGRAM

## 2024-08-19 PROCEDURE — 82728 ASSAY OF FERRITIN: CPT | Performed by: STUDENT IN AN ORGANIZED HEALTH CARE EDUCATION/TRAINING PROGRAM

## 2024-08-19 PROCEDURE — 83735 ASSAY OF MAGNESIUM: CPT | Performed by: STUDENT IN AN ORGANIZED HEALTH CARE EDUCATION/TRAINING PROGRAM

## 2024-08-19 PROCEDURE — 99232 SBSQ HOSP IP/OBS MODERATE 35: CPT | Performed by: INTERNAL MEDICINE

## 2024-08-19 PROCEDURE — 36415 COLL VENOUS BLD VENIPUNCTURE: CPT | Performed by: STUDENT IN AN ORGANIZED HEALTH CARE EDUCATION/TRAINING PROGRAM

## 2024-08-19 PROCEDURE — 2500000001 HC RX 250 WO HCPCS SELF ADMINISTERED DRUGS (ALT 637 FOR MEDICARE OP): Performed by: INTERNAL MEDICINE

## 2024-08-19 PROCEDURE — 84443 ASSAY THYROID STIM HORMONE: CPT | Performed by: INTERNAL MEDICINE

## 2024-08-19 PROCEDURE — 82607 VITAMIN B-12: CPT | Performed by: STUDENT IN AN ORGANIZED HEALTH CARE EDUCATION/TRAINING PROGRAM

## 2024-08-19 PROCEDURE — 85025 COMPLETE CBC W/AUTO DIFF WBC: CPT | Performed by: STUDENT IN AN ORGANIZED HEALTH CARE EDUCATION/TRAINING PROGRAM

## 2024-08-19 PROCEDURE — 84439 ASSAY OF FREE THYROXINE: CPT | Performed by: INTERNAL MEDICINE

## 2024-08-19 PROCEDURE — 82746 ASSAY OF FOLIC ACID SERUM: CPT | Performed by: STUDENT IN AN ORGANIZED HEALTH CARE EDUCATION/TRAINING PROGRAM

## 2024-08-19 PROCEDURE — 2500000001 HC RX 250 WO HCPCS SELF ADMINISTERED DRUGS (ALT 637 FOR MEDICARE OP): Performed by: STUDENT IN AN ORGANIZED HEALTH CARE EDUCATION/TRAINING PROGRAM

## 2024-08-19 RX ORDER — FERROUS SULFATE 325(65) MG
65 TABLET ORAL
Status: DISCONTINUED | OUTPATIENT
Start: 2024-08-20 | End: 2024-08-20 | Stop reason: HOSPADM

## 2024-08-19 RX ORDER — LANOLIN ALCOHOL/MO/W.PET/CERES
1000 CREAM (GRAM) TOPICAL DAILY
Status: DISCONTINUED | OUTPATIENT
Start: 2024-08-19 | End: 2024-08-20 | Stop reason: HOSPADM

## 2024-08-19 RX ORDER — FOLIC ACID 1 MG/1
1 TABLET ORAL DAILY
Status: DISCONTINUED | OUTPATIENT
Start: 2024-08-19 | End: 2024-08-20 | Stop reason: HOSPADM

## 2024-08-19 ASSESSMENT — ENCOUNTER SYMPTOMS
DIFFICULTY URINATING: 0
HEADACHES: 0
ABDOMINAL PAIN: 0
CHILLS: 0
APPETITE CHANGE: 0
PHOTOPHOBIA: 0
NUMBNESS: 0
SHORTNESS OF BREATH: 0
TROUBLE SWALLOWING: 0
ARTHRALGIAS: 0
BACK PAIN: 0
NECK STIFFNESS: 0
FREQUENCY: 0
MYALGIAS: 0
CONFUSION: 0
RHINORRHEA: 0
RECTAL PAIN: 0
VOICE CHANGE: 0
POLYDIPSIA: 0
TREMORS: 0
PALPITATIONS: 0
UNEXPECTED WEIGHT CHANGE: 0
VOMITING: 0
BLOOD IN STOOL: 0
DIAPHORESIS: 0
SINUS PAIN: 0
SEIZURES: 0
COLOR CHANGE: 1
DYSURIA: 0
CONSTIPATION: 0
NERVOUS/ANXIOUS: 0
SLEEP DISTURBANCE: 0
SINUS PRESSURE: 0
JOINT SWELLING: 0
FEVER: 0
ABDOMINAL DISTENTION: 0
FLANK PAIN: 0
WEAKNESS: 0
DECREASED CONCENTRATION: 0
SORE THROAT: 0
HEMATURIA: 0
DIZZINESS: 0
LIGHT-HEADEDNESS: 0
NECK PAIN: 0
FATIGUE: 0
WHEEZING: 0
SPEECH DIFFICULTY: 0
CHEST TIGHTNESS: 0
FACIAL ASYMMETRY: 0
DIARRHEA: 0
WOUND: 0
COUGH: 0
NAUSEA: 0
ACTIVITY CHANGE: 0

## 2024-08-19 ASSESSMENT — COGNITIVE AND FUNCTIONAL STATUS - GENERAL
MOBILITY SCORE: 24
DAILY ACTIVITIY SCORE: 24
MOBILITY SCORE: 24
DAILY ACTIVITIY SCORE: 24

## 2024-08-19 ASSESSMENT — PAIN SCALES - GENERAL
PAINLEVEL_OUTOF10: 8
PAINLEVEL_OUTOF10: 0 - NO PAIN

## 2024-08-19 ASSESSMENT — PAIN - FUNCTIONAL ASSESSMENT
PAIN_FUNCTIONAL_ASSESSMENT: 0-10
PAIN_FUNCTIONAL_ASSESSMENT: 0-10

## 2024-08-19 NOTE — CARE PLAN
Problem: Fall/Injury  Goal: Not fall by end of shift  Outcome: Progressing  Goal: Be free from injury by end of the shift  Outcome: Progressing  Goal: Verbalize understanding of personal risk factors for fall in the hospital  Outcome: Progressing  Goal: Verbalize understanding of risk factor reduction measures to prevent injury from fall in the home  Outcome: Progressing  Goal: Use assistive devices by end of the shift  Outcome: Progressing  Goal: Pace activities to prevent fatigue by end of the shift  Outcome: Progressing     Problem: Pain - Adult  Goal: Verbalizes/displays adequate comfort level or baseline comfort level  Outcome: Progressing     Problem: Safety - Adult  Goal: Free from fall injury  Outcome: Progressing     Problem: Discharge Planning  Goal: Discharge to home or other facility with appropriate resources  Outcome: Progressing     Problem: Chronic Conditions and Co-morbidities  Goal: Patient's chronic conditions and co-morbidity symptoms are monitored and maintained or improved  Outcome: Progressing     Problem: Pain  Goal: Takes deep breaths with improved pain control throughout the shift  Outcome: Progressing  Goal: Turns in bed with improved pain control throughout the shift  Outcome: Progressing  Goal: Walks with improved pain control throughout the shift  Outcome: Progressing  Goal: Performs ADL's with improved pain control throughout shift  Outcome: Progressing  Goal: Participates in PT with improved pain control throughout the shift  Outcome: Progressing  Goal: Free from opioid side effects throughout the shift  Outcome: Progressing  Goal: Free from acute confusion related to pain meds throughout the shift  Outcome: Progressing   The patient's goals for the shift include      The clinical goals for the shift include Pt will remain at a tolerable pain level this shift.

## 2024-08-19 NOTE — PROGRESS NOTES
Arsh Manzo is a 39 y.o. male on day 1 of admission presenting with Epididymoorchitis.      Subjective   The patient is seen and evaluated today.  He states his pain has improved and swelling is improving.  Denies any fever chills nausea vomiting or abdominal pain.       Objective     Last Recorded Vitals  /70 (BP Location: Left arm, Patient Position: Sitting)   Pulse 83   Temp 36.7 °C (98 °F) (Temporal)   Resp 16   Wt 83.9 kg (185 lb)   SpO2 96%   Intake/Output last 3 Shifts:    Intake/Output Summary (Last 24 hours) at 8/19/2024 1637  Last data filed at 8/19/2024 1502  Gross per 24 hour   Intake 661 ml   Output 0 ml   Net 661 ml       Admission Weight  Weight: 83.9 kg (185 lb) (08/18/24 1501)    Daily Weight  08/18/24 : 83.9 kg (185 lb)    Image Results  US scrotum w doppler  Narrative: STUDY:  Ultrasound of the scrotum and testicular with Doppler evaluation;  8/18/2024 4:48 PM  INDICATION:  Testicular swelling.  COMPARISON:  US scrotum 8/14/2024  ACCESSION NUMBER(S):  [id number]  ORDERING CLINICIAN:  LESLEY CHÁVEZ  FINDINGS:  The right testicle measures 3.7 x 3.9 x 2.0 cm.  It is heterogeneous  in echotexture.  Normal color and spectral Doppler flow is  demonstrated.      The right epididymis measures 0.9 x 0.6 x 1.2 cm and demonstrates  normal echogenicity.   The left testicle measures 4.4 x 2.8 x 4.5 cm. It is heterogeneous in  echotexture.  Increased color and spectral Doppler flow is  demonstrated.      The left epididymis measures 0.8 x 1.2 x 2.9 cm and demonstrates  heterogeneous echogenicity as well as increased color flow.  There is a complex left-sided hydrocele present.   There is bilateral skin thickening.  Impression: Heterogeneous, hypervascular left testicle and epididymis suggestive  of epididymo-orchitis.    Complex left-sided hydrocele.  Bilateral skin thickening.  Normal right testicular spectral Doppler evaluation.  Signed by Madhu Ortiz MD  XR chest 2 views  Narrative:  STUDY:  Chest Radiographs;  8/18/2024 16:04  INDICATION:  Shortness of breath.  COMPARISON:  None Available  ACCESSION NUMBER(S):  [id number]  ORDERING CLINICIAN:  LESLEY CHÁVEZ  TECHNIQUE:  Frontal and lateral chest.   FINDINGS:  CARDIOMEDIASTINAL SILHOUETTE:  Cardiomediastinal silhouette is normal in size and configuration.     LUNGS:  There are mildly increased markings in the lungs without evidence of  focal infiltrate.     ABDOMEN:  No remarkable upper abdominal findings.     BONES:  No acute osseous changes.  Impression: Mildly prominent lung markings without evidence of focal infiltrate..  Signed by Akil Leblanc MD      Physical Exam  Constitutional:       Appearance: Normal appearance.   HENT:      Head: Normocephalic and atraumatic.      Nose: Nose normal.      Mouth/Throat:      Mouth: Mucous membranes are dry.   Eyes:      Extraocular Movements: Extraocular movements intact.      Conjunctiva/sclera: Conjunctivae normal.   Cardiovascular:      Rate and Rhythm: Normal rate and regular rhythm.      Pulses: Normal pulses.      Heart sounds: Normal heart sounds.   Pulmonary:      Effort: Pulmonary effort is normal.      Breath sounds: Normal breath sounds.   Abdominal:      General: Bowel sounds are normal.      Palpations: Abdomen is soft.   Genitourinary:     Comments: Patient's scrotum swollen but improving with a an enlarged left testicle, tenderness and erythema has improved as well.  Musculoskeletal:         General: Normal range of motion.      Cervical back: Normal range of motion and neck supple.   Skin:     General: Skin is warm and dry.   Neurological:      General: No focal deficit present.      Mental Status: He is alert and oriented to person, place, and time. Mental status is at baseline.   Psychiatric:         Mood and Affect: Mood normal.         Behavior: Behavior normal.         Thought Content: Thought content normal.         Judgment: Judgment normal.         Relevant Results                Assessment/Plan            39 y.o. male with no reported PMHx, on no regular medications, presenting with worsening erythema, swelling, and persistent pain to the scrotum/L testicle since prior diagnosis of epididymitis, despite taking antibiotic therapy and prescribed NSAIDs. Admitted to medicine for IV antibiotics (ceftriaxone and doxycycline) as per [facility] recommendations.       Assessment & Plan  Epididymoorchitis    Normocytic normochromic anemia    Elevated brain natriuretic peptide (BNP) level    Epididymoorchitis  -Patient reports he is not sexually active. Denies trauma to the area.    -Typically, older than 40 would expected most common bacterial cause to be E. col  -Continue ceftriaxone, doxycycline as per urology ([facility]) recs   -Continue multimodal analgesia, testicular elevation, ice packs, supportive care as able   -Urology and ID were consulted for out pt failure of treatment. Pt is improving at this time.      Normocytic normochromic anemia   -Low B12 and Folic Acid. Replace. Denies alcohol abuse.      Elevated BNP   CXR with mildly increased interstitial markings   -Patient not complaining of any SOB, BRIGGS, HF-like symptoms   -Echocardiogram in out pt settings.      Mild hyponatremia   -Na improved     Diet: Regular   DVT Prophylaxis: SCDs, subcutaneous Lovenox   Code Status: Full Code              Lauren Berrios MD

## 2024-08-19 NOTE — PROGRESS NOTES
Arsh Manzo is a 39 y.o. male admitted for Epididymoorchitis. Pharmacy reviewed the patient's [id number] medications and allergies for accuracy.    The list below reflects the PTA list prior to pharmacy medication history. A summary a changes to the PTA medication list has been listed below. Please review each medication in order reconciliation for additional clarification and justification.    Source of information: T2P -- NO CHANGES     Medications added:    Medications modified:    Medications to be removed:    Medications of concern:      Prior to Admission Medications   Prescriptions Last Dose Informant Patient Reported? Taking?   acetaminophen (Tylenol Extra Strength) 500 mg tablet 8/18/2024  No Yes   Sig: Take 2 tablets (1,000 mg) by mouth every 8 hours if needed for moderate pain (4 - 6) for up to 5 days.   cefuroxime (Ceftin) 500 mg tablet 8/18/2024  No Yes   Sig: Take 1 tablet (500 mg) by mouth 2 times a day for 10 days.   doxycycline (Vibramycin) 100 mg capsule 8/18/2024  No Yes   Sig: Take 1 capsule (100 mg) by mouth 2 times a day for 10 days. Take with at least 8 ounces (large glass) of water, do not lie down for 30 minutes after   ketorolac (Toradol) 10 mg tablet 8/18/2024  No Yes   Sig: Take 1 tablet (10 mg) by mouth every 6 hours if needed for moderate pain (4 - 6) for up to 5 days.      Facility-Administered Medications: None       JENNIE LUO

## 2024-08-19 NOTE — PROGRESS NOTES
Social work consult placed for discharge planning. SW reviewed pt's chart and communicated with [facility]. No SW needs foreseen at this time. SW signing off; available upon request.    Alan Dewitt, MSW, LSW ([phone])   Care Transitions

## 2024-08-19 NOTE — H&P
[facility] - GENERAL MEDICINE HISTORY AND PHYSICAL    History Obtained From: Patient  Collateral History: Chart review, d/w ED physician     History Of Present Illness:  Arsh Manzo is a 39 y.o. male with no reported significant medical history, presenting with worsening scrotal/left testicular swelling, redness, and persistent pain with recent diagnosis of epididymitis on 08/14/2024. Patient reports that he was initially seen 4 days ago in the emergency department due to similar symptoms, after CT and ultrasound, patient was discharged to home with antibiotics (Ceftin, doxycycline), NSAID (Toradol), and extra strength Tylenol. Has been taking his antibiotics as prescribed. He admits to persistent pain, it is not worsening but attributes this to taking prescribed pain medications; has had worsening swelling and redness of the scrotum but most predominantly on the left side. He has not had any associated urinary symptoms, and denies F/C/N/V/D. He reports that he is not sexually active, has had no trauma to the area. He denies similar diagnoses in the past. He denies CP/pressure, palpitations, SOB, BRIGGS, abdominal pain, HA, vision changes, focal and/or lateralizing sensory or motor deficits. He denies melena, hematochezia, hematemesis, hemoptysis, hematuria, or other evidence of bleeding.     ED Course (Summary):   Vitals on presentation: T98.5, /69, HR 81, RR 20, SpO2 100% RA  Labs:   CBC: WBC 13.0 (08/14) --> 7.1 (08/18), resolution of neutrophil predominance on diff; Hgb 11.7 --> 10.2  CMP today with glucose 110, Na 133 (improved from 129), K 4.0, BUN 17, sCr 1.01   Lactate 1.1      UA: Leukocyte esterase, >50 WBCs, remainder of micro pending  Imaging:   US scrotum (08/14/2024): Enlarged heterogeneous hypervascular left epididymis suggestive of epididymitis, complex left hydrocele > US scrotum (08/18/2024): Heterogeneous, hypervascular left testicle and epididymis suggestive of  epididymoorchitis  CT abdomen pelvis with IV contrast (08/14/2024): Wall thickening of the distal esophagus suggesting esophagitis, left scrotum showing a small hydrocele and increased vascularity, small fat-containing inguinal hernias bilaterally slightly larger on the left, no hydronephrosis or renal calculi  CXR (08/18/2024): Mildly prominent lung markings without evidence of focal infiltrate  Interventions: 1 L normal saline, 4 mg Zofran, 4 mg morphine, 15 mg Toradol, admitted to medicine for further management  Consultations: ED physician touched base with the on-call urologist at [facility] due to lack of urology coverage at [place], recommendation was for admission for at least 24 hours of IV antibiotics and recommended continuing IV doxycycline and ceftriaxone, additionally recommended scrotal/testicular elevation and ice packs, supportive care    Notably, patient requests physical exam be performed more in-depth by male provider if able, so my attending Dr. Flores will input the physical exam into this note.     ED Course (From ed Provider):  ED Course as of 08/18/24 2200   Sun Aug 18, 2024   2124 This spoke with the on-call urologist at [facility], Dr. Ford, recommended admitting for 24 hours for IV antibiotics with testicular ovation and ice pack.  I specifically asked about if patient should be transferred as we do not have urology here and he said no there is no surgical that needs to be done and patient can be admitted at this hospital.  [RS]   2130 Patient accepted to hospitalist here for further treatment. [RS]      ED Course User Index  [RS] Milan Greenberg DO         Diagnoses as of 08/18/24 2200   Epididymitis     Relevant Results  Results for orders placed or performed during the hospital encounter of 08/18/24 (from the past 24 hour(s))   CBC and Auto Differential   Result Value Ref Range    WBC 7.1 4.4 - 11.3 x10*3/uL    nRBC 0.0 0.0 - 0.0 /100 WBCs    RBC 3.57 (L) 4.50 - 5.90 x10*6/uL    Hemoglobin 10.2 (L)  13.5 - 17.5 g/dL    Hematocrit 31.7 (L) 41.0 - 52.0 %    MCV 89 80 - 100 fL    MCH 28.6 26.0 - 34.0 pg    MCHC 32.2 32.0 - 36.0 g/dL    RDW 15.0 (H) 11.5 - 14.5 %    Platelets 285 150 - 450 x10*3/uL    Neutrophils % 74.8 40.0 - 80.0 %    Immature Granulocytes %, Automated 0.7 0.0 - 0.9 %    Lymphocytes % 13.5 13.0 - 44.0 %    Monocytes % 8.6 2.0 - 10.0 %    Eosinophils % 1.7 0.0 - 6.0 %    Basophils % 0.7 0.0 - 2.0 %    Neutrophils Absolute 5.32 1.20 - 7.70 x10*3/uL    Immature Granulocytes Absolute, Automated 0.05 0.00 - 0.70 x10*3/uL    Lymphocytes Absolute 0.96 (L) 1.20 - 4.80 x10*3/uL    Monocytes Absolute 0.61 0.10 - 1.00 x10*3/uL    Eosinophils Absolute 0.12 0.00 - 0.70 x10*3/uL    Basophils Absolute 0.05 0.00 - 0.10 x10*3/uL   Comprehensive Metabolic Panel   Result Value Ref Range    Glucose 110 (H) 74 - 99 mg/dL    Sodium 133 (L) 136 - 145 mmol/L    Potassium 4.0 3.5 - 5.3 mmol/L    Chloride 101 98 - 107 mmol/L    Bicarbonate 25 21 - 32 mmol/L    Anion Gap 11 10 - 20 mmol/L    Urea Nitrogen 17 6 - 23 mg/dL    Creatinine 1.01 0.50 - 1.30 mg/dL    eGFR >90 >60 mL/min/1.73m*2    Calcium 8.7 8.6 - 10.3 mg/dL    Albumin 3.4 3.4 - 5.0 g/dL    Alkaline Phosphatase 79 33 - 120 U/L    Total Protein 7.9 6.4 - 8.2 g/dL    AST 31 9 - 39 U/L    Bilirubin, Total 0.7 0.0 - 1.2 mg/dL    ALT 28 10 - 52 U/L   Lactate   Result Value Ref Range    Lactate 1.1 0.4 - 2.0 mmol/L   Troponin I, High Sensitivity   Result Value Ref Range    Troponin I, High Sensitivity 5 0 - 20 ng/L   B-Type Natriuretic Peptide   Result Value Ref Range     (H) 0 - 99 pg/mL   Urinalysis with Reflex Culture and Microscopic   Result Value Ref Range    Color, Urine Yellow Light-Yellow, Yellow, Dark-Yellow    Appearance, Urine Turbid (N) Clear    Specific Gravity, Urine 1.032 1.005 - 1.035    pH, Urine 6.0 5.0, 5.5, 6.0, 6.5, 7.0, 7.5, 8.0    Protein, Urine 30 (1+) (A) NEGATIVE, 10 (TRACE), 20 (TRACE) mg/dL    Glucose, Urine Normal Normal mg/dL     Blood, Urine 0.06 (1+) (A) NEGATIVE    Ketones, Urine NEGATIVE NEGATIVE mg/dL    Bilirubin, Urine NEGATIVE NEGATIVE    Urobilinogen, Urine Normal Normal mg/dL    Nitrite, Urine NEGATIVE NEGATIVE    Leukocyte Esterase, Urine 500 Ruben/µL (A) NEGATIVE   Microscopic Only, Urine   Result Value Ref Range    WBC, Urine >50 (A) 1-5, NONE /HPF    RBC, Urine 3-5 NONE, 1-2, 3-5 /HPF    Squamous Epithelial Cells, Urine 1-9 (SPARSE) Reference range not established. /HPF    Mucus, Urine FEW Reference range not established. /LPF    Amorphous Crystals, Urine 2+ NONE, 1+, 2+ /HPF      US scrotum w doppler    Result Date: 8/18/2024  STUDY: Ultrasound of the scrotum and testicular with Doppler evaluation; 8/18/2024 4:48 PM INDICATION: Testicular swelling. COMPARISON: US scrotum 8/14/2024 ACCESSION NUMBER(S): [id number] ORDERING CLINICIAN: LESLEY CHÁVEZ FINDINGS: The right testicle measures 3.7 x 3.9 x 2.0 cm.  It is heterogeneous in echotexture.  Normal color and spectral Doppler flow is demonstrated.    The right epididymis measures 0.9 x 0.6 x 1.2 cm and demonstrates normal echogenicity. The left testicle measures 4.4 x 2.8 x 4.5 cm. It is heterogeneous in echotexture.  Increased color and spectral Doppler flow is demonstrated.    The left epididymis measures 0.8 x 1.2 x 2.9 cm and demonstrates heterogeneous echogenicity as well as increased color flow. There is a complex left-sided hydrocele present. There is bilateral skin thickening.    Heterogeneous, hypervascular left testicle and epididymis suggestive of epididymo-orchitis.  Complex left-sided hydrocele.  Bilateral skin thickening. Normal right testicular spectral Doppler evaluation. Signed by Madhu Ortiz MD    XR chest 2 views    Result Date: 8/18/2024  STUDY: Chest Radiographs;  8/18/2024 16:04 INDICATION: Shortness of breath. COMPARISON: None Available ACCESSION NUMBER(S): [id number] ORDERING CLINICIAN: LESLEY CHÁVEZ TECHNIQUE:  Frontal and lateral chest. FINDINGS:  CARDIOMEDIASTINAL SILHOUETTE: Cardiomediastinal silhouette is normal in size and configuration.  LUNGS: There are mildly increased markings in the lungs without evidence of focal infiltrate.  ABDOMEN: No remarkable upper abdominal findings.  BONES: No acute osseous changes.    Mildly prominent lung markings without evidence of focal infiltrate.. Signed by Akil Leblanc MD    Scheduled medications:  cefTRIAXone, 1 g, intravenous, Once  [START ON 8/19/2024] cefTRIAXone, 1 g, intravenous, q24h  doxycycline, 100 mg, intravenous, Once  doxycycline, 100 mg, intravenous, q12h  enoxaparin, 40 mg, subcutaneous, q24h  ketorolac, 30 mg, intravenous, q8h JANAE  [START ON 8/19/2024] pantoprazole, 40 mg, oral, Daily before breakfast   Or  [START ON 8/19/2024] pantoprazole, 40 mg, intravenous, Daily before breakfast  [START ON 8/19/2024] polyethylene glycol, 17 g, oral, Daily      Continuous medications:     PRN medications:  PRN medications: acetaminophen, bisacodyl, bisacodyl, guaiFENesin, melatonin, ondansetron **OR** ondansetron, oxyCODONE, oxyCODONE-acetaminophen     Past Medical History  He has no past medical history on file.    Surgical History  He has no past surgical history on file.     Social History  He reports that he has never smoked. He has never used smokeless tobacco. He reports current alcohol use. He reports that he does not use drugs.    Family History  No family history on file.    Allergies  Patient has no known allergies.    Code Status  Full Code     Review of Systems   Constitutional:  Negative for activity change, appetite change, chills, diaphoresis, fatigue, fever and unexpected weight change.   HENT:  Negative for congestion, drooling, hearing loss, mouth sores, postnasal drip, rhinorrhea, sinus pressure, sinus pain, sneezing, sore throat, tinnitus, trouble swallowing and voice change.    Eyes:  Negative for photophobia and visual disturbance.   Respiratory:  Negative for cough, chest tightness,  shortness of breath and wheezing.    Cardiovascular:  Negative for chest pain, palpitations and leg swelling.   Gastrointestinal:  Negative for abdominal distention, abdominal pain, blood in stool, constipation, diarrhea, nausea, rectal pain and vomiting.   Endocrine: Negative for polydipsia and polyuria.   Genitourinary:  Positive for penile pain, scrotal swelling and testicular pain. Negative for decreased urine volume, difficulty urinating, dysuria, flank pain, frequency, genital sores, hematuria, penile discharge, penile swelling and urgency.   Musculoskeletal:  Negative for arthralgias, back pain, gait problem, joint swelling, myalgias, neck pain and neck stiffness.   Skin:  Positive for color change. Negative for rash and wound.   Neurological:  Negative for dizziness, tremors, seizures, syncope, facial asymmetry, speech difficulty, weakness, light-headedness, numbness and headaches.   Psychiatric/Behavioral:  Negative for confusion, decreased concentration and sleep disturbance. The patient is not nervous/anxious.    All other systems reviewed and are negative.    Last Recorded Vitals  /69 (BP Location: Left arm, Patient Position: Sitting)   Pulse 81   Temp 36.9 °C (98.5 °F) (Tympanic)   Resp 20   Wt 83.9 kg (185 lb)   SpO2 100%      Physical Exam  Vitals and nursing note reviewed.   Constitutional:       General: He is awake. He is not in acute distress.     Appearance: Normal appearance. He is well-developed. He is not ill-appearing or diaphoretic.   HENT:      Head: Normocephalic and atraumatic.   Eyes:      General: No scleral icterus.     Extraocular Movements: Extraocular movements intact.      Conjunctiva/sclera: Conjunctivae normal.      Pupils: Pupils are equal, round, and reactive to light.   Cardiovascular:      Rate and Rhythm: Normal rate and regular rhythm.      Pulses: Normal pulses.      Heart sounds: No murmur heard.     No friction rub. No gallop.   Pulmonary:      Effort:  Pulmonary effort is normal. No respiratory distress.      Breath sounds: No wheezing, rhonchi or rales.   Chest:      Chest wall: No tenderness.   Abdominal:      General: Bowel sounds are normal. There is no distension.      Palpations: Abdomen is soft. There is no hepatomegaly, splenomegaly or mass.      Tenderness: There is no abdominal tenderness. There is no right CVA tenderness, left CVA tenderness, guarding or rebound.   Genitourinary:     Comments: Patient's scrotum is notably swollen with a an enlarged left testicle with exquisite tenderness to palpation with movement and about 3 times is not enlarged as his right testicle  Musculoskeletal:         General: No deformity or signs of injury. Normal range of motion.   Skin:     General: Skin is warm and dry.      Capillary Refill: Capillary refill takes less than 2 seconds.      Findings: Erythema present. No lesion or rash.   Neurological:      General: No focal deficit present.      Mental Status: He is alert and oriented to person, place, and time. Mental status is at baseline.      Cranial Nerves: No cranial nerve deficit.      Sensory: No sensory deficit.      Motor: No weakness.      Coordination: Coordination normal.      Gait: Gait normal.   Psychiatric:         Mood and Affect: Mood normal.         Behavior: Behavior normal. Behavior is cooperative.         Thought Content: Thought content normal.         Judgment: Judgment normal.        Assessment/Plan     39 y.o. male with no reported PMHx, on no regular medications, presenting with worsening erythema, swelling, and persistent pain to the scrotum/L testicle since prior diagnosis of epididymitis, despite taking antibiotic therapy and prescribed NSAIDs. Admitted to medicine for IV antibiotics (ceftriaxone and doxycycline) as per [facility] urology recommendations.    Epididymoorchitis  -Patient reports he is not sexually active. Denies trauma to the area.    -Typically, older than 40 would expected most  common bacterial cause to be E. col  -Continue ceftriaxone, doxycycline as [place]   -Continue scheduled toradol (watch H&H - see below)  -Continue multimodal analgesia, testicular elevation, ice packs, supportive care as able   -Currently no listed urology coverage at [place] tomorrow (08/19/24), but may need to confirm again tomorrow and see if this has changed it was described to the patient that urology coverage is not available and he is still excepting of care here.  -Patient instructed that if he were to have any severely worsening pain or inability to urinate to please notify staff promptly  -Will discuss with the day team about repeat imaging with ultrasound versus CT but at this time ultrasound of the scrotum did not note any signs of torsion.  -Note: patient denies any recent viral illnesses, no prodromal symptoms prior to first presentation on 08/14/24. Considerations for viral etiology of orchitis made; less likely at this point. However, with presentation and significant swelling of the L testicle and overall presentation, will place ID consultation and appreciate recs.     Normocytic normochromic anemia   -Last Hgb in the computer 11.7 --> 10.2 today   -Denies any evidence of bleeding as stated in HPI   -Will check basic anemia labs in AM   -Monitor Hgb with NSAID use and DVT chemoprophylaxis     Elevated BNP   CXR with mildly increased interstitial markings   -Patient not complaining of any SOB, BRIGGS, HF-like symptoms   -Echocardiogram can likely be completed outpatient with PCP follow-up     Mild hyponatremia   -Na 133 today, improved from 129 four days ago   -Continue to trend     Diet: Regular   DVT Prophylaxis: SCDs, subcutaneous Lovenox   Code Status: Full Code     Carol Gasca PA-C    Dragon dictation software was used to dictate this note and thus there may be minor errors in translation/transcription including garbled speech or misspellings. Please contact for  clarification if needed.

## 2024-08-20 VITALS
TEMPERATURE: 97.6 F | SYSTOLIC BLOOD PRESSURE: 113 MMHG | HEART RATE: 76 BPM | OXYGEN SATURATION: 98 % | WEIGHT: 185 LBS | HEIGHT: 70 IN | RESPIRATION RATE: 16 BRPM | BODY MASS INDEX: 26.48 KG/M2 | DIASTOLIC BLOOD PRESSURE: 73 MMHG

## 2024-08-20 LAB — BACTERIA UR CULT: NO GROWTH

## 2024-08-20 PROCEDURE — 2500000001 HC RX 250 WO HCPCS SELF ADMINISTERED DRUGS (ALT 637 FOR MEDICARE OP): Performed by: INTERNAL MEDICINE

## 2024-08-20 PROCEDURE — 2500000004 HC RX 250 GENERAL PHARMACY W/ HCPCS (ALT 636 FOR OP/ED): Performed by: STUDENT IN AN ORGANIZED HEALTH CARE EDUCATION/TRAINING PROGRAM

## 2024-08-20 PROCEDURE — 99233 SBSQ HOSP IP/OBS HIGH 50: CPT | Performed by: UROLOGY

## 2024-08-20 PROCEDURE — 99239 HOSP IP/OBS DSCHRG MGMT >30: CPT | Performed by: INTERNAL MEDICINE

## 2024-08-20 RX ORDER — FOLIC ACID 1 MG/1
1 TABLET ORAL DAILY
Qty: 14 TABLET | Refills: 0 | Status: SHIPPED | OUTPATIENT
Start: 2024-08-21 | End: 2024-09-04

## 2024-08-20 RX ORDER — SULFAMETHOXAZOLE AND TRIMETHOPRIM 800; 160 MG/1; MG/1
1 TABLET ORAL 2 TIMES DAILY
Qty: 28 TABLET | Refills: 0 | Status: SHIPPED | OUTPATIENT
Start: 2024-08-20 | End: 2024-08-20 | Stop reason: HOSPADM

## 2024-08-20 RX ORDER — LANOLIN ALCOHOL/MO/W.PET/CERES
1000 CREAM (GRAM) TOPICAL DAILY
Qty: 14 TABLET | Refills: 0 | Status: SHIPPED | OUTPATIENT
Start: 2024-08-21 | End: 2024-09-04

## 2024-08-20 RX ORDER — CEFDINIR 300 MG/1
300 CAPSULE ORAL 2 TIMES DAILY
Qty: 28 CAPSULE | Refills: 0 | Status: SHIPPED | OUTPATIENT
Start: 2024-08-20 | End: 2024-09-03

## 2024-08-20 RX ORDER — DOXYCYCLINE 100 MG/1
100 CAPSULE ORAL 2 TIMES DAILY
Qty: 28 CAPSULE | Refills: 0 | Status: SHIPPED | OUTPATIENT
Start: 2024-08-20 | End: 2024-09-03

## 2024-08-20 RX ORDER — FERROUS SULFATE 325(65) MG
65 TABLET ORAL
Qty: 30 TABLET | Refills: 0 | Status: SHIPPED | OUTPATIENT
Start: 2024-08-21 | End: 2024-09-20

## 2024-08-20 ASSESSMENT — COGNITIVE AND FUNCTIONAL STATUS - GENERAL
DAILY ACTIVITIY SCORE: 24
MOBILITY SCORE: 24

## 2024-08-20 ASSESSMENT — PAIN SCALES - GENERAL: PAINLEVEL_OUTOF10: 5 - MODERATE PAIN

## 2024-08-20 ASSESSMENT — PAIN - FUNCTIONAL ASSESSMENT: PAIN_FUNCTIONAL_ASSESSMENT: 0-10

## 2024-08-20 NOTE — PROGRESS NOTES
08/20/24 1044   Discharge Planning   Living Arrangements Alone   Support Systems Family members   Assistance Needed None   Type of Residence Private residence   Home or Post Acute Services None   Expected Discharge Disposition Home   Does the patient need discharge transport arranged? No   Patient Choice   Patient / Family choosing to utilize agency / facility established prior to hospitalization No     Discharge assessment complete. Patient lives at home alone. Patient does not currently have a PCP and would like referral at discharge. Patient is overall independent at home without any ambulation concerns. Patient also drives self to and from appointments without concern. Patient denies any discharge needs at this time. Patient is aware of Care Transitions if any needs should arise.

## 2024-08-20 NOTE — PROGRESS NOTES
[facility] INFECTIOUS DISEASE PROGRESS NOTE    Patient Name: Arsh Manzo  MRN: [id number]    INTERVAL HISTORY:   No fevers or chills.  Continues to have testicular pain and swelling    Patient Active Problem List   Diagnosis    Epididymoorchitis    Normocytic normochromic anemia    Elevated brain natriuretic peptide (BNP) level        ASSESSMENT:   Left greater than right epididymoorchitis: The microbiological etiology of this disease is usually related to E. coli.  The patient has failed Ceftin and doxycycline from the emergency room earlier in the course of illness     Chronic anemia  Elevated BNP  Hyponatremia              Plan  Use IV ceftriaxone  Continue doxycycline.  If no response then may need to be changed to vancomycin  Antibiotics will need to be changed to vancomycin and Zosyn if poor response in the next 18 to 24 hours  Testicular ultrasound  Scrotal elevation  Edema management  Correct sodium  Urology consultation may be helpful  Monitor temperatures and counts  Recent urine cultures from 8/18 reviewed and are negative.  The patient does not currently have any dysuria and has been on antibiotics which will make urine cultures unlikely to be positive  Will obtain blood cultures if fevers occur  Testicular/scrotal ultrasound    MEDICATIONS: reviewed.    Current Facility-Administered Medications:     acetaminophen (Tylenol) tablet 650 mg, 650 mg, oral, q4h PRN, Carol Gasca PA-C    bisacodyl (Dulcolax) EC tablet 10 mg, 10 mg, oral, Daily PRN, Carol Gasca PA-C    bisacodyl (Dulcolax) suppository 10 mg, 10 mg, rectal, Daily PRN, Carol Gasca PA-C    cefTRIAXone (Rocephin) 1 g in dextrose (iso) IV 50 mL, 1 g, intravenous, q24h, Carol Gasca PA-C, Stopped at 08/19/24 2255    cyanocobalamin (Vitamin B-12) tablet 1,000 mcg, 1,000 mcg, oral, Daily, Lauren Berrios MD, 1,000 mcg at 08/19/24 1807    doxycycline (Vibramycin) 100 mg in sodium chloride 0.9%  mL, 100 mg,  "intravenous, q12h, Carol Gasca PA-C, Stopped at 24 0022    enoxaparin (Lovenox) syringe 40 mg, 40 mg, subcutaneous, q24h, Carol Gasca PA-C, 40 mg at 24 2223    ferrous sulfate (325 mg ferrous sulfate) tablet 1 tablet, 65 mg of iron, oral, Daily with breakfast, Lauren Berrios MD    folic acid (Folvite) tablet 1 mg, 1 mg, oral, Daily, Lauren Berrios MD, 1 mg at 24 1807    guaiFENesin (Mucinex) 12 hr tablet 600 mg, 600 mg, oral, q12h PRN, Carol Gasca PA-C    ketorolac (Toradol) injection 30 mg, 30 mg, intravenous, q8h JANAE, Carol Gasca PA-C, 30 mg at 24 0715    melatonin tablet 3 mg, 3 mg, oral, Nightly PRN, Carol Gasca PA-C    ondansetron (Zofran) tablet 4 mg, 4 mg, oral, q8h PRN **OR** ondansetron (Zofran) injection 4 mg, 4 mg, intravenous, q8h PRN, Carol Gasca PA-C    oxyCODONE (Roxicodone) immediate release tablet 2.5 mg, 2.5 mg, oral, q4h PRN, Carol Gasca PA-C    oxyCODONE-acetaminophen (Percocet) 5-325 mg per tablet 1 tablet, 1 tablet, oral, q6h PRN, Carol Gasca PA-C, 1 tablet at 24 2320    pantoprazole (ProtoNix) EC tablet 40 mg, 40 mg, oral, Daily before breakfast, 40 mg at 24 0613 **OR** pantoprazole (ProtoNix) injection 40 mg, 40 mg, intravenous, Daily before breakfast, Carol Gasca PA-C    polyethylene glycol (Glycolax, Miralax) packet 17 g, 17 g, oral, Daily, Carol Kobak-Vernon, PA-C     PHYSICAL EXAM:  Vital signs: /77 (BP Location: Right arm, Patient Position: Lying)   Pulse 74   Temp 36.6 °C (97.8 °F) (Temporal)   Resp 12   Ht 1.778 m (5' 10\")   Wt 83.9 kg (185 lb)   SpO2 95%   BMI 26.54 kg/m²   Temp (24hrs), Av.4 °C (97.6 °F), Min:36.1 °C (97 °F), Max:36.7 °C (98 °F)    General: alert, oriented, NAD  Lungs: bilaterally clear to auscultation  Heart: regular rate and rhythm  Abdomen: soft, non tender, non distended, BS+  Extremities: no edema  No rashes  No joint inflammation  Neck " supple  Lines ok  No CVAT  Left greater than right testicular erythema, swelling, tenderness    Labs:    Results for orders placed or performed during the hospital encounter of 08/18/24 (from the past 96 hour(s))   CBC and Auto Differential   Result Value Ref Range    WBC 7.1 4.4 - 11.3 x10*3/uL    nRBC 0.0 0.0 - 0.0 /100 WBCs    RBC 3.57 (L) 4.50 - 5.90 x10*6/uL    Hemoglobin 10.2 (L) 13.5 - 17.5 g/dL    Hematocrit 31.7 (L) 41.0 - 52.0 %    MCV 89 80 - 100 fL    MCH 28.6 26.0 - 34.0 pg    MCHC 32.2 32.0 - 36.0 g/dL    RDW 15.0 (H) 11.5 - 14.5 %    Platelets 285 150 - 450 x10*3/uL    Neutrophils % 74.8 40.0 - 80.0 %    Immature Granulocytes %, Automated 0.7 0.0 - 0.9 %    Lymphocytes % 13.5 13.0 - 44.0 %    Monocytes % 8.6 2.0 - 10.0 %    Eosinophils % 1.7 0.0 - 6.0 %    Basophils % 0.7 0.0 - 2.0 %    Neutrophils Absolute 5.32 1.20 - 7.70 x10*3/uL    Immature Granulocytes Absolute, Automated 0.05 0.00 - 0.70 x10*3/uL    Lymphocytes Absolute 0.96 (L) 1.20 - 4.80 x10*3/uL    Monocytes Absolute 0.61 0.10 - 1.00 x10*3/uL    Eosinophils Absolute 0.12 0.00 - 0.70 x10*3/uL    Basophils Absolute 0.05 0.00 - 0.10 x10*3/uL   Comprehensive Metabolic Panel   Result Value Ref Range    Glucose 110 (H) 74 - 99 mg/dL    Sodium 133 (L) 136 - 145 mmol/L    Potassium 4.0 3.5 - 5.3 mmol/L    Chloride 101 98 - 107 mmol/L    Bicarbonate 25 21 - 32 mmol/L    Anion Gap 11 10 - 20 mmol/L    Urea Nitrogen 17 6 - 23 mg/dL    Creatinine 1.01 0.50 - 1.30 mg/dL    eGFR >90 >60 mL/min/1.73m*2    Calcium 8.7 8.6 - 10.3 mg/dL    Albumin 3.4 3.4 - 5.0 g/dL    Alkaline Phosphatase 79 33 - 120 U/L    Total Protein 7.9 6.4 - 8.2 g/dL    AST 31 9 - 39 U/L    Bilirubin, Total 0.7 0.0 - 1.2 mg/dL    ALT 28 10 - 52 U/L   Lactate   Result Value Ref Range    Lactate 1.1 0.4 - 2.0 mmol/L   Troponin I, High Sensitivity   Result Value Ref Range    Troponin I, High Sensitivity 5 0 - 20 ng/L   B-Type Natriuretic Peptide   Result Value Ref Range     (H) 0 -  99 pg/mL   Urinalysis with Reflex Culture and Microscopic   Result Value Ref Range    Color, Urine Yellow Light-Yellow, Yellow, Dark-Yellow    Appearance, Urine Turbid (N) Clear    Specific Gravity, Urine 1.032 1.005 - 1.035    pH, Urine 6.0 5.0, 5.5, 6.0, 6.5, 7.0, 7.5, 8.0    Protein, Urine 30 (1+) (A) NEGATIVE, 10 (TRACE), 20 (TRACE) mg/dL    Glucose, Urine Normal Normal mg/dL    Blood, Urine 0.06 (1+) (A) NEGATIVE    Ketones, Urine NEGATIVE NEGATIVE mg/dL    Bilirubin, Urine NEGATIVE NEGATIVE    Urobilinogen, Urine Normal Normal mg/dL    Nitrite, Urine NEGATIVE NEGATIVE    Leukocyte Esterase, Urine 500 Ruben/µL (A) NEGATIVE   Extra Urine Gray Tube   Result Value Ref Range    Extra Tube Hold for add-ons.    Microscopic Only, Urine   Result Value Ref Range    WBC, Urine >50 (A) 1-5, NONE /HPF    RBC, Urine 3-5 NONE, 1-2, 3-5 /HPF    Squamous Epithelial Cells, Urine 1-9 (SPARSE) Reference range not established. /HPF    Mucus, Urine FEW Reference range not established. /LPF    Amorphous Crystals, Urine 2+ NONE, 1+, 2+ /HPF   Urine Culture    Specimen: Clean Catch/Voided; Urine   Result Value Ref Range    Urine Culture No growth    CBC and Auto Differential   Result Value Ref Range    WBC 6.4 4.4 - 11.3 x10*3/uL    nRBC 0.0 0.0 - 0.0 /100 WBCs    RBC 3.26 (L) 4.50 - 5.90 x10*6/uL    Hemoglobin 9.3 (L) 13.5 - 17.5 g/dL    Hematocrit 29.3 (L) 41.0 - 52.0 %    MCV 90 80 - 100 fL    MCH 28.5 26.0 - 34.0 pg    MCHC 31.7 (L) 32.0 - 36.0 g/dL    RDW 15.0 (H) 11.5 - 14.5 %    Platelets 259 150 - 450 x10*3/uL    Neutrophils % 64.7 40.0 - 80.0 %    Immature Granulocytes %, Automated 0.5 0.0 - 0.9 %    Lymphocytes % 22.6 13.0 - 44.0 %    Monocytes % 8.9 2.0 - 10.0 %    Eosinophils % 2.5 0.0 - 6.0 %    Basophils % 0.8 0.0 - 2.0 %    Neutrophils Absolute 4.12 1.20 - 7.70 x10*3/uL    Immature Granulocytes Absolute, Automated 0.03 0.00 - 0.70 x10*3/uL    Lymphocytes Absolute 1.44 1.20 - 4.80 x10*3/uL    Monocytes Absolute 0.57 0.10 -  1.00 x10*3/uL    Eosinophils Absolute 0.16 0.00 - 0.70 x10*3/uL    Basophils Absolute 0.05 0.00 - 0.10 x10*3/uL   Comprehensive Metabolic Panel   Result Value Ref Range    Glucose 79 74 - 99 mg/dL    Sodium 135 (L) 136 - 145 mmol/L    Potassium 3.8 3.5 - 5.3 mmol/L    Chloride 105 98 - 107 mmol/L    Bicarbonate 23 21 - 32 mmol/L    Anion Gap 11 10 - 20 mmol/L    Urea Nitrogen 16 6 - 23 mg/dL    Creatinine 0.88 0.50 - 1.30 mg/dL    eGFR >90 >60 mL/min/1.73m*2    Calcium 8.7 8.6 - 10.3 mg/dL    Albumin 3.1 (L) 3.4 - 5.0 g/dL    Alkaline Phosphatase 71 33 - 120 U/L    Total Protein 7.2 6.4 - 8.2 g/dL    AST 35 9 - 39 U/L    Bilirubin, Total 0.6 0.0 - 1.2 mg/dL    ALT 27 10 - 52 U/L   Magnesium   Result Value Ref Range    Magnesium 2.09 1.60 - 2.40 mg/dL   Folate   Result Value Ref Range    Folate, Serum 4.1 (L) >5.0 ng/mL   Iron and TIBC   Result Value Ref Range    Iron 30 (L) 35 - 150 ug/dL    UIBC 304 110 - 370 ug/dL    TIBC 334 240 - 445 ug/dL    % Saturation 9 (L) 25 - 45 %   Ferritin   Result Value Ref Range    Ferritin 59 20 - 300 ng/mL   Vitamin B12   Result Value Ref Range    Vitamin B12 200 (L) 211 - 911 pg/mL   TSH with reflex to Free T4 if abnormal   Result Value Ref Range    Thyroid Stimulating Hormone 5.68 (H) 0.44 - 3.98 mIU/L   Thyroxine, Free   Result Value Ref Range    Thyroxine, Free 1.11 0.61 - 1.12 ng/dL      Microbiology data: reviewed    Imaging data: reviewed      Mendez Taylor  Pager:[phone]  Date of service: 8/20/2024  Time of service: 8:36 AM

## 2024-08-20 NOTE — CARE PLAN
The patient's goals for the shift include      The clinical goals for the shift include pt will maintain adequate pain control this shift      Problem: Fall/Injury  Goal: Not fall by end of shift  Outcome: Progressing  Goal: Be free from injury by end of the shift  Outcome: Progressing  Goal: Verbalize understanding of personal risk factors for fall in the hospital  Outcome: Progressing  Goal: Verbalize understanding of risk factor reduction measures to prevent injury from fall in the home  Outcome: Progressing  Goal: Use assistive devices by end of the shift  Outcome: Progressing  Goal: Pace activities to prevent fatigue by end of the shift  Outcome: Progressing     Problem: Pain - Adult  Goal: Verbalizes/displays adequate comfort level or baseline comfort level  Outcome: Progressing     Problem: Safety - Adult  Goal: Free from fall injury  Outcome: Progressing     Problem: Discharge Planning  Goal: Discharge to home or other facility with appropriate resources  Outcome: Progressing     Problem: Chronic Conditions and Co-morbidities  Goal: Patient's chronic conditions and co-morbidity symptoms are monitored and maintained or improved  Outcome: Progressing     Problem: Pain  Goal: Takes deep breaths with improved pain control throughout the shift  Outcome: Progressing  Goal: Turns in bed with improved pain control throughout the shift  Outcome: Progressing  Goal: Walks with improved pain control throughout the shift  Outcome: Progressing  Goal: Performs ADL's with improved pain control throughout shift  Outcome: Progressing  Goal: Participates in PT with improved pain control throughout the shift  Outcome: Progressing  Goal: Free from opioid side effects throughout the shift  Outcome: Progressing  Goal: Free from acute confusion related to pain meds throughout the shift  Outcome: Progressing

## 2024-08-20 NOTE — DISCHARGE SUMMARY
Discharge Diagnosis  Epididymoorchitis    Issues Requiring Follow-Up  Follow-up with PCP in 2 weeks.  Follow-up with urology in 2 weeks.  Patient needs echocardiogram in outpatient settings.  Patient also needs further blood work and possible workup for nutritional deficiencies.    Discharge Meds     Your medication list        START taking these medications        Instructions Last Dose Given Next Dose Due   cefdinir 300 mg capsule  Commonly known as: Omnicef      Take 1 capsule (300 mg) by mouth 2 times a day for 14 days.       cyanocobalamin 1,000 mcg tablet  Commonly known as: Vitamin B-12  Start taking on: August 21, 2024      Take 1 tablet (1,000 mcg) by mouth once daily for 14 days.       ferrous sulfate (325 mg ferrous sulfate) tablet  Start taking on: August 21, 2024      Take 1 tablet by mouth once daily with breakfast.       folic acid 1 mg tablet  Commonly known as: Folvite  Start taking on: August 21, 2024      Take 1 tablet (1 mg) by mouth once daily for 14 days.              CONTINUE taking these medications        Instructions Last Dose Given Next Dose Due   doxycycline 100 mg capsule  Commonly known as: Vibramycin      Take 1 capsule (100 mg) by mouth 2 times a day for 14 days. Take with at least 8 ounces (large glass) of water, do not lie down for 30 minutes after              STOP taking these medications      acetaminophen 500 mg tablet  Commonly known as: Tylenol Extra Strength        cefuroxime 500 mg tablet  Commonly known as: Ceftin        ketorolac 10 mg tablet  Commonly known as: Toradol                  Where to Get Your Medications        These medications were sent to [facility] [address] [address], [facility] [address]      Phone: [phone]   cefdinir 300 mg capsule  cyanocobalamin 1,000 mcg tablet  doxycycline 100 mg capsule  ferrous sulfate (325 mg ferrous sulfate) tablet  folic acid 1 mg tablet         Test Results Pending At  Discharge  Pending Labs       No current pending labs.            Hospital Course   39 y.o. male with no reported PMHx, on no regular medications, presenting with worsening erythema, swelling, and persistent pain to the scrotum/L testicle since prior diagnosis of epididymitis, despite taking antibiotic therapy and prescribed NSAIDs. Admitted to medicine for IV antibiotics (ceftriaxone and doxycycline) as per [facility] urology recommendations.  With ceftriaxone doxycycline the erythema tenderness and the swelling improved.  Urology and infectious disease saw the patient.  Patient is otherwise improving per urology the patient can be discharged on Bactrim for 14 days with outpatient follow-up. ID recommended Omnicef 300 b.i.d. and doxycycline hundred b.i.d. for two weeks and then Urology follow up. The patient was also noted to have elevated BNP the patient is otherwise asymptomatic no dyspnea on exertion or heart failure like symptoms.  Recommend outpatient echocardiogram.  Patient was also noted to be anemic with low B12 and folic acid levels.  They are being replaced and recommend outpatient follow-up with PCP for nutritional deficiency workup.  Patient otherwise requesting discharge and is stable for discharge with outpatient follow-up with       Pertinent Physical Exam At Time of Discharge  Physical Exam  Constitutional:       Appearance: Normal appearance.   HENT:      Head: Normocephalic and atraumatic.      Nose: Nose normal.      Mouth/Throat:      Mouth: Mucous membranes are dry.   Eyes:      Extraocular Movements: Extraocular movements intact.      Conjunctiva/sclera: Conjunctivae normal.   Cardiovascular:      Rate and Rhythm: Normal rate and regular rhythm.      Pulses: Normal pulses.      Heart sounds: Normal heart sounds.   Pulmonary:      Effort: Pulmonary effort is normal.      Breath sounds: Normal breath sounds.   Abdominal:      General: Bowel sounds are normal.      Palpations: Abdomen is soft.    Genitourinary:     Comments: Patient's scrotum swollen but improving with a an enlarged left testicle, tenderness and erythema has improved as well.  Musculoskeletal:         General: Normal range of motion.      Cervical back: Normal range of motion and neck supple.   Skin:     General: Skin is warm and dry.   Neurological:      General: No focal deficit present.      Mental Status: He is alert and oriented to person, place, and time. Mental status is at baseline.   Psychiatric:         Mood and Affect: Mood normal.         Behavior: Behavior normal.         Thought Content: Thought content normal.         Judgment: Judgment normal.     Outpatient Follow-Up  No future appointments.      Lauren Berrios MD

## 2024-08-20 NOTE — CARE PLAN
The patient's goals for the shift include      The clinical goals for the shift include Patient will remain hemodynamically stable throughout shift      Problem: Fall/Injury  Goal: Not fall by end of shift  Outcome: Progressing  Goal: Be free from injury by end of the shift  Outcome: Progressing  Goal: Verbalize understanding of personal risk factors for fall in the hospital  Outcome: Progressing  Goal: Verbalize understanding of risk factor reduction measures to prevent injury from fall in the home  Outcome: Progressing  Goal: Use assistive devices by end of the shift  Outcome: Progressing  Goal: Pace activities to prevent fatigue by end of the shift  Outcome: Progressing     Problem: Pain - Adult  Goal: Verbalizes/displays adequate comfort level or baseline comfort level  Outcome: Progressing     Problem: Safety - Adult  Goal: Free from fall injury  Outcome: Progressing     Problem: Discharge Planning  Goal: Discharge to home or other facility with appropriate resources  Outcome: Progressing     Problem: Chronic Conditions and Co-morbidities  Goal: Patient's chronic conditions and co-morbidity symptoms are monitored and maintained or improved  Outcome: Progressing     Problem: Pain  Goal: Takes deep breaths with improved pain control throughout the shift  Outcome: Progressing  Goal: Turns in bed with improved pain control throughout the shift  Outcome: Progressing  Goal: Walks with improved pain control throughout the shift  Outcome: Progressing  Goal: Performs ADL's with improved pain control throughout shift  Outcome: Progressing  Goal: Participates in PT with improved pain control throughout the shift  Outcome: Progressing  Goal: Free from opioid side effects throughout the shift  Outcome: Progressing  Goal: Free from acute confusion related to pain meds throughout the shift  Outcome: Progressing

## 2024-08-20 NOTE — CONSULTS
Infectious Disease Inpatient Consult    Inpatient consult to Infectious Diseases  Consult performed by: Mendez Taylor MD  Consult ordered by: Carol Gasca PA-C      Primary MD: GENERIC EXTERNAL DATA PROVIDER    Reason For Consult  Epididymoorchitis    History Of Present Illness  Arsh Manzo is a 39 y.o. male presenting with worsening scrotal/left testicular swelling, redness, and persistent pain with recent diagnosis of epididymitis on 08/14/2024. Patient reports that he was initially seen 4 days ago in the emergency department due to similar symptoms, after CT and ultrasound, patient was discharged to home with antibiotics (Ceftin, doxycycline), NSAID (Toradol), and extra strength Tylenol. Has been taking his antibiotics as prescribed. He admits to persistent pain, it is not worsening but attributes this to taking prescribed pain medications; has had worsening swelling and redness of the scrotum but most predominantly on the left side. He has not had any associated urinary symptoms, and denies F/C/N/V/D. He reports that he is not sexually active, has had no trauma to the area. He denies similar diagnoses in the past. He denies CP/pressure, palpitations, SOB, BRIGGS, abdominal pain, HA, vision changes, focal and/or lateralizing sensory or motor deficits. He denies melena, hematochezia, hematemesis, hemoptysis, hematuria, or other evidence of bleeding. .     Past Medical History  He has no past medical history on file.    Surgical History  He has no past surgical history on file.     Social History     Occupational History    Not on file   Tobacco Use    Smoking status: Never    Smokeless tobacco: Never   Substance and Sexual Activity    Alcohol use: Yes     Comment: occasionally    Drug use: Never    Sexual activity: Not on file     Travel History   Travel since 07/20/24    No documented travel since 07/20/24          Family History  No family history on file.  Allergies  Patient has no known  allergies.       There is no immunization history on file for this patient.  Medications  Home medications:  Medications Prior to Admission   Medication Sig Dispense Refill Last Dose    [] acetaminophen (Tylenol Extra Strength) 500 mg tablet Take 2 tablets (1,000 mg) by mouth every 8 hours if needed for moderate pain (4 - 6) for up to 5 days. 30 tablet 0 2024    cefuroxime (Ceftin) 500 mg tablet Take 1 tablet (500 mg) by mouth 2 times a day for 10 days. 20 tablet 0 2024    doxycycline (Vibramycin) 100 mg capsule Take 1 capsule (100 mg) by mouth 2 times a day for 10 days. Take with at least 8 ounces (large glass) of water, do not lie down for 30 minutes after 20 capsule 0 2024    [] ketorolac (Toradol) 10 mg tablet Take 1 tablet (10 mg) by mouth every 6 hours if needed for moderate pain (4 - 6) for up to 5 days. 20 tablet 0 2024     Current medications:  Scheduled medications  cefTRIAXone, 1 g, intravenous, q24h  cyanocobalamin, 1,000 mcg, oral, Daily  doxycycline, 100 mg, intravenous, q12h  enoxaparin, 40 mg, subcutaneous, q24h  ferrous sulfate (325 mg ferrous sulfate), 65 mg of iron, oral, Daily with breakfast  folic acid, 1 mg, oral, Daily  ketorolac, 30 mg, intravenous, q8h JANAE  pantoprazole, 40 mg, oral, Daily before breakfast   Or  pantoprazole, 40 mg, intravenous, Daily before breakfast  polyethylene glycol, 17 g, oral, Daily      Continuous medications     PRN medications  PRN medications: acetaminophen, bisacodyl, bisacodyl, guaiFENesin, melatonin, ondansetron **OR** ondansetron, oxyCODONE, oxyCODONE-acetaminophen    Review of Systems     Objective  Range of Vitals (last 24 hours)  Heart Rate:  [69-83]   Temp:  [36.1 °C (97 °F)-36.7 °C (98 °F)]   Resp:  [12-17]   BP: (105-119)/(67-77)   SpO2:  [95 %-96 %]   Daily Weight  24 : 83.9 kg (185 lb)    Body mass index is 26.54 kg/m².     Physical Exam  /77 (BP Location: Right arm, Patient Position: Lying)   Pulse 74    "Temp 36.6 °C (97.8 °F) (Temporal)   Resp 12   Ht 1.778 m (5' 10\")   Wt 83.9 kg (185 lb)   SpO2 95%   BMI 26.54 kg/m²   Temp (24hrs), Av.4 °C (97.6 °F), Min:36.1 °C (97 °F), Max:36.7 °C (98 °F)      General: alert, oriented, NAD  Lungs: bilaterally clear to auscultation  Heart: regular rate and rhythm  Abdomen: soft, non tender, non distended, BS+  Extremities: no edema  Scrotal swelling with enlarged left testicle tenderness to palpation.  Right testicular tenderness as well  No rashes  No joint inflammation  Neck supple  Lines ok  No CVAT     Relevant Results    Labs  Results from last 72 hours   Lab Units 24  0348 24  1527   WBC AUTO x10*3/uL 6.4 7.1   HEMOGLOBIN g/dL 9.3* 10.2*   HEMATOCRIT % 29.3* 31.7*   PLATELETS AUTO x10*3/uL 259 285   NEUTROS PCT AUTO % 64.7 74.8   LYMPHS PCT AUTO % 22.6 13.5   MONOS PCT AUTO % 8.9 8.6   EOS PCT AUTO % 2.5 1.7     Results from last 72 hours   Lab Units 24  0348 24  1527   SODIUM mmol/L 135* 133*   POTASSIUM mmol/L 3.8 4.0   CHLORIDE mmol/L 105 101   CO2 mmol/L 23 25   BUN mg/dL 16 17   CREATININE mg/dL 0.88 1.01   GLUCOSE mg/dL 79 110*   CALCIUM mg/dL 8.7 8.7   ANION GAP mmol/L 11 11   EGFR mL/min/1.73m*2 >90 >90     Results from last 72 hours   Lab Units 24  0348 24  1527   ALK PHOS U/L 71 79   BILIRUBIN TOTAL mg/dL 0.6 0.7   PROTEIN TOTAL g/dL 7.2 7.9   ALT U/L 27 28   AST U/L 35 31   ALBUMIN g/dL 3.1* 3.4     Estimated Creatinine Clearance: 115.2 mL/min (by C-G formula based on SCr of 0.88 mg/dL).  No results found for: \"CRP\", \"SEDRATE\"  No results found for: \"HIV1X2\", \"HIVCONF\", \"QISGMQ2IN\"  No results found for: \"HEPCABINIT\", \"HEPCAB\", \"HCVPCRQUANT\"  Microbiology  No results found for the last 100 days.          No lab exists for component: \"AGALPCRNB\"                        Imaging  US scrotum w doppler    Result Date: 2024  STUDY: Ultrasound of the scrotum and testicular with Doppler evaluation; 2024 4:48 PM " INDICATION: Testicular swelling. COMPARISON: US scrotum 8/14/2024 ACCESSION NUMBER(S): [id number] ORDERING CLINICIAN: LESLEY CHÁVEZ FINDINGS: The right testicle measures 3.7 x 3.9 x 2.0 cm.  It is heterogeneous in echotexture.  Normal color and spectral Doppler flow is demonstrated.    The right epididymis measures 0.9 x 0.6 x 1.2 cm and demonstrates normal echogenicity. The left testicle measures 4.4 x 2.8 x 4.5 cm. It is heterogeneous in echotexture.  Increased color and spectral Doppler flow is demonstrated.    The left epididymis measures 0.8 x 1.2 x 2.9 cm and demonstrates heterogeneous echogenicity as well as increased color flow. There is a complex left-sided hydrocele present. There is bilateral skin thickening.    Heterogeneous, hypervascular left testicle and epididymis suggestive of epididymo-orchitis.  Complex left-sided hydrocele.  Bilateral skin thickening. Normal right testicular spectral Doppler evaluation. Signed by Madhu Ortiz MD    XR chest 2 views    Result Date: 8/18/2024  STUDY: Chest Radiographs;  8/18/2024 16:04 INDICATION: Shortness of breath. COMPARISON: None Available ACCESSION NUMBER(S): [id number] ORDERING CLINICIAN: LESLEY CHÁVEZ TECHNIQUE:  Frontal and lateral chest. FINDINGS: CARDIOMEDIASTINAL SILHOUETTE: Cardiomediastinal silhouette is normal in size and configuration.  LUNGS: There are mildly increased markings in the lungs without evidence of focal infiltrate.  ABDOMEN: No remarkable upper abdominal findings.  BONES: No acute osseous changes.    Mildly prominent lung markings without evidence of focal infiltrate.. Signed by Akil Leblanc MD    CT abdomen pelvis w IV contrast    Result Date: 8/14/2024  STUDY: CT Abdomen and Pelvis with IV Contrast; 08/14/2024 7:23 PM INDICATION: Testicular pain. COMPARISON: None. ACCESSION NUMBER(S): [id number] ORDERING CLINICIAN: JOSIANE JOHNSON TECHNIQUE: CT of the abdomen and pelvis was performed.  Contiguous axial images were obtained  at 3 mm slice thickness through the abdomen and pelvis. Coronal and sagittal reconstructions at 3 mm slice thickness were performed.  75 mL Omnipaque-350 was administered intravenously.  Oral contrast was administered as a component of this study; the patient received approximately QUANTITY mL of TYPE OF CONTRAST by mouth. FINDINGS: LOWER CHEST: No cardiomegaly.  No pericardial effusion.  The lower esophagus shows significant wall thickening suggesting esophagitis.  The lung bases are clear with no consolidation or effusion.  ABDOMEN:  LIVER: No hepatomegaly.  Smooth surface contour.  There does appear to be mild diffuse fatty infiltration in the liver but there are no space-occupying lesions.  BILE DUCTS: No intrahepatic or extrahepatic biliary ductal dilatation.  GALLBLADDER: The gallbladder is unremarkable. STOMACH: No abnormalities identified.  PANCREAS: No masses or ductal dilatation.  SPLEEN: No splenomegaly or focal splenic lesion.  ADRENAL GLANDS: No thickening or nodules.  KIDNEYS AND URETERS: Kidneys are normal in size and location.  No renal or ureteral calculi.  There is no hydronephrosis and both ureters are normal.  PELVIS:  BLADDER: No abnormalities identified.  REPRODUCTIVE ORGANS: No abnormalities identified.  BOWEL: The small bowel is unremarkable and the appendix is normal.  There are a few scattered diverticula in the left colon but there is no evidence of diverticulitis; otherwise is unremarkable.  VESSELS: No abnormalities identified.  Abdominal aorta is normal in caliber.  PERITONEUM/RETROPERITONEUM/LYMPH NODES: No free fluid.  No pneumoperitoneum. No lymphadenopathy.  ABDOMINAL WALL: No abnormalities identified. SOFT TISSUES: There are small fat-containing inguinal hernias bilaterally, slightly larger on the left.  There also is a hydrocele and enhanced vascularity in the left scrotum.  BONES: No acute fracture or aggressive osseous lesion.    In the included chest there is wall thickening in  the distal esophagus suggesting esophagitis.. The left scrotum shows a small hydrocele and increased vascularity. There are small fat-containing inguinal hernias bilaterally, slightly larger on the left. There are no renal calculi and no evidence of hydronephrosis. Signed by Ernesto Witt MD    US scrotum    Result Date: 8/14/2024  STUDY: Scrotal Ultrasound; 8/14/2024 6:13 PM. INDICATION: Left testicular pain and swelling since last night. COMPARISON: None available. ACCESSION NUMBER(S): [id number] ORDERING CLINICIAN: JOSIANE JOHNSON TECHNIQUE:  Ultrasound of the scrotum and testicular spectral Doppler evaluation performed. FINDINGS:    The right testicle measures 3.9 x 1.4 x 2.1 cm.  It demonstrates a normal homogeneous echotexture.  Normal color and spectral Doppler flow is demonstrated.  The right epididymis measures 1.7 x 0.2 x 1.1 cm and demonstrates normal echogenicity. The left testicle measures 4.5 x 2.3 x 4.2 cm.  It demonstrates a normal homogeneous echotexture.  Normal color and spectral Doppler flow is demonstrated.  The left epididymis measures 4.5 x 0.5 x 1.4 cm and demonstrates heterogeneous echogenicity as well as increased color flow. There is a complex left-sided hydrocele present.     Enlarged, heterogeneous, hypervascular left epididymis suggestive of epididymitis. Complex left hydrocele. Normal testicular spectral Doppler evaluation. Signed by Nick Osei MD      Echo  No results found for this or any previous visit.    Assessment   Left greater than right epididymoorchitis: The microbiological etiology of this disease is usually related to E. coli.  The patient has failed Ceftin and doxycycline from the emergency room earlier in the course of illness    Chronic anemia  Elevated BNP  Hyponatremia      Plan   Use IV ceftriaxone  Continue doxycycline.  If no response then may need to be changed to vancomycin  Testicular ultrasound  Scrotal elevation  Edema management  Correct  sodium  Urology consultation may be helpful  Monitor temperatures and counts  Recent urine cultures from 8/18 reviewed and are negative.  The patient does not currently have any dysuria and has been on antibiotics which will make urine cultures unlikely to be positive  Will obtain blood cultures if fevers occur  Testicular/scrotal ultrasound    I spent 32 minutes in the professional and overall care of this patient.      Mendez Taylor MD